# Patient Record
Sex: FEMALE | Race: WHITE | Employment: FULL TIME | URBAN - METROPOLITAN AREA
[De-identification: names, ages, dates, MRNs, and addresses within clinical notes are randomized per-mention and may not be internally consistent; named-entity substitution may affect disease eponyms.]

---

## 2017-06-26 ENCOUNTER — HOSPITAL ENCOUNTER (OUTPATIENT)
Dept: PHYSICAL THERAPY | Age: 32
Discharge: HOME | End: 2017-06-26
Payer: COMMERCIAL

## 2017-06-26 PROCEDURE — 97162 PT EVAL MOD COMPLEX 30 MIN: CPT

## 2017-06-26 PROCEDURE — 97110 THERAPEUTIC EXERCISES: CPT

## 2017-06-26 NOTE — PROGRESS NOTES
PHYSICAL THERAPY - DAILY TREATMENT NOTE    Patient Name: Javier Love        Date: 2017  : 1985   YES Patient  Verified  Visit #:     Insurance: Payor: Portillo Fontanez / [place]: [place] / Product Type: PPO /      In time: 2:46 Out time: 3:41   Total Treatment Time: 55     Medicare Time Tracking (below)   Total Timed Codes (min):  na 1:1 Treatment Time:  na     TREATMENT AREA =  Right shoulder    SUBJECTIVE    Pain Level (on 0 to 10 scale):  4  / 10   Medication Changes/New allergies or changes in medical history, any new surgeries or procedures? NO    If yes, update Summary List   Subjective Functional Status/Changes:  []  No changes reported     See POC          OBJECTIVE    10 min Therapeutic Exercise:  [x]  See flow sheet   Rationale:      increase ROM, increase strength and reduce pain to improve the patients ability to lift/exercise/reach     Other Objective/Functional Measures:    Shown and performed HEP     Post Treatment Pain Level (on 0 to 10) scale:   4 / 10     ASSESSMENT  Assessment/Changes in Function:     See POC     []  See Progress Note/Recertification   Patient will continue to benefit from skilled PT services to modify and progress therapeutic interventions, address functional mobility deficits, address strength deficits, analyze and address soft tissue restrictions, analyze and cue movement patterns and analyze and modify body mechanics/ergonomics to attain goals per POC.    Progress toward goals / Updated goals:    See POC     PLAN  [x]  Upgrade activities as tolerated YES Continue plan of care   []  Discharge due to :    []  Other: Will send CRPS contrast bath for UE procedure for HEP as well     Therapist: Jihan Quevedo PT, DPT, OCS, CSCS    Date: 2017 Time: 3:43 PM

## 2017-06-26 NOTE — PROGRESS NOTES
[address] PHYSICAL THERAPY  [address] [place] [address] 51, [address],[facility], 70 [facility] - Phone: [phone]  Fax: [phone] / [facility]  Patient Name: Cassie Johnson : 1985   Medical   Diagnosis: Right shoulder, brachial plexus injury Treatment Diagnosis: Right shoulder pain   Onset Date: , then approx 2 weeks ago     Referral Source: Maxwell Shetty MD Start of Care [facility]): 2017   Prior Hospitalization: See medical history Provider #: [id number]   Prior Level of Function: Prior to recent onset no sig diff with lifting if stabilized shoudler in advance, less radicular symptoms down UE   Comorbidities: none   Medications: Verified on Patient Summary List   The Plan of Care and following information is based on the information from the initial evaluation.   ===========================================================================================  Assessment / rice information:  Cassie Johnson is a 33 y.o.  yo female with Dx: right shoulder/arm pain, brachial plexus injury, who reports initial injury in  playing softball and colliding with large player, had UE numbness up to neck/eye, down to radial hand/arm with some cold sensations as well as skin changes at times with some swelling, symptoms improved and was able to return to some activity, then attempted softball again and had reinjury with throwing, went to PT, improved and was able to do general lifts if setting shoulder prior to lifting; still pain and numbness/tingling with most axillary traction exercises and any impact/pressure to upper trap on right or deltoid on right. Pt rates pain as 9/10 max, 2/10 at best, 4/10 today. MD has ordered and Xray, has previously been on meds including steriods, anti-inflammatories; had EMG after original injury but none recently; lidocaine injection recently to upper trap with some relief. Objective: FOTO score = 47 (an established functional score where 100 = no disability). UE AROM WNL and grossly bilat sym although noted hesitation with Right UE AROM into forward elevation and some distress at zone of impingment. Pt does have audible pop with bilat shlds fwd elevation. UE strength manual muscle testing normal and symmetrical for all planes. Palpation shows tenderness without any triggering at right upper trap, post cuff, post CS and subacromial space. ULTT 1 median nerve position on right, minimally pos on left. Adson/Connor's TOS tests negative. CS compression neg. Full CS AROM without pain (some reported stretch with left CS lat flexion on right UT). Pt instructed in HEP and will f/u in clinic for PT.  ===========================================================================================  Eval Complexity: History MEDIUM  Complexity : 1-2 comorbidities / personal factors will impact the outcome/ POC ;  Examination  HIGH Complexity : 4+ Standardized tests and measures addressing body structure, function, activity limitation and / or participation in recreation ; Presentation MEDIUM Complexity : Evolving with changing characteristics ;   Decision Making MEDIUM Complexity : FOTO score of 26-74; Overall Complexity MEDIUM  Problem List: pain affecting function, decrease ROM, decrease strength, decrease ADL/ functional abilitiies and decrease activity tolerance FOTO = 47  Treatment Plan may include any combination of the following: Therapeutic exercise, Therapeutic activities, Neuromuscular re-education, Physical agent/modality, Manual therapy, Patient education and Self Care training  Patient / Family readiness to learn indicated by: asking questions, trying to perform skills and interest  Persons(s) to be included in education: patient (P)  Barriers to Learning/Limitations: no  Measures taken:    Patient Goal (s): \"not be affected by weather, participate in activity\"   Patient self reported health status: good  Rehabilitation Potential: good   Short Term Goals: To be accomplished in  1-2  weeks:  1. Independent with HEP. 2. Decrease max pain 25-50% to assist with lifting/reaching overhead for ADLs.  Long Term Goals: To be accomplished in  4-6  weeks:  1. Decrease max pain 50-75% to assist with gym activity and work activity without hindering performance. 2.  Improve FOTO Functional Status Score by 21 points in order to show significant functional improvement. 3.  Will rate a +5 on Global Rating of Change and be prepared to DC to HEP. Frequency / Duration:   Patient to be seen  2-3  times per week for 4-6  weeks:  Patient / Caregiver education and instruction: self care and exercises  G-Codes (GP): HARPER  Therapist Signature: Colt Odom PT, DPT, OCS, CSCS Date: 5/60/9981   Certification Period: NA Time: 3:43 PM   ===========================================================================================  I certify that the above Physical Therapy Services are being furnished while the patient is under my care. I agree with the treatment plan and certify that this therapy is necessary. Physician Signature:        Date:       Time:     Please sign and return to [facility] or you may fax the signed copy to [phone]. Thank you.

## 2017-06-30 ENCOUNTER — HOSPITAL ENCOUNTER (OUTPATIENT)
Dept: PHYSICAL THERAPY | Age: 32
Discharge: HOME | End: 2017-06-30
Payer: COMMERCIAL

## 2017-06-30 PROCEDURE — 97110 THERAPEUTIC EXERCISES: CPT

## 2017-06-30 PROCEDURE — 97140 MANUAL THERAPY 1/> REGIONS: CPT

## 2017-06-30 NOTE — PROGRESS NOTES
PHYSICAL THERAPY - DAILY TREATMENT NOTE    Patient Name: Rosemarie Bond        Date: 2017  : 1985   YES Patient  Verified  Visit #:   2   of   12  Insurance: Payor: Cristian Chavira / Plan: [place] [employer] / Product Type: PPO /      In time: 9:35 Out time: 10:20   Total Treatment Time: 45     Medicare Time Tracking (below)   Total Timed Codes (min):  na 1:1 Treatment Time:  na     TREATMENT AREA =  Right shoulder pain [M25.511]    SUBJECTIVE  Pain Level (on 0 to 10 scale):  0  / 10   Medication Changes/New allergies or changes in medical history, any new surgeries or procedures? NO    If yes, update Summary List   Subjective Functional Status/Changes:  []  No changes reported     No new c/o          OBJECTIVE      20 min Therapeutic Exercise:  [x]  See flow sheet   Rationale:      increase ROM, increase strength and improve coordination to improve the patients ability to perform ADLs     25 min Manual Therapy: DTM teres, post delt, Pec, SCM, Scalene, C2 mob with R Rot, R 1st rib mob, T/S mob   Rationale:      decrease pain, increase ROM and increase tissue extensibility to improve patient's ability to perform ADLs       min Patient Education:  YES  Reviewed HEP   []  Progressed/Changed HEP based on: Other Objective/Functional Measures:    Difficulty with UE roll noted (sup to pro more difficulty)     Post Treatment Pain Level (on 0 to 10) scale:   0  / 10     ASSESSMENT  Assessment/Changes in Function:     Good buck to all Rx without increase in pain      []  See Progress Note/Recertification   Patient will continue to benefit from skilled PT services to modify and progress therapeutic interventions, address functional mobility deficits, address ROM deficits, address strength deficits, analyze and address soft tissue restrictions, analyze and cue movement patterns, analyze and modify body mechanics/ergonomics and assess and modify postural abnormalities to attain remaining goals.    Progress toward goals / Updated goals:     Independent with HEP     PLAN  []  Upgrade activities as tolerated YES Continue plan of care   []  Discharge due to :    []  Other:      Therapist: Todd Valencia, PT, OCS, SCS, CSCS    Date: 6/30/2017 Time: 10:42 AM       Future Appointments  Date Time Provider Dolly Solorio   7/3/2017 4:30 PM Kristal Rivera, PT [facility]   7/7/2017 9:00 AM Patti Meeks, PT [facility]   7/18/2017 5:00 PM Patti Meeks, PT [facility]   7/20/2017 2:00 PM Tray Gonzalez, PT [facility]   7/25/2017 9:00 AM Tray Gonzalez, PT [facility]   7/27/2017 8:00 AM Patti Meeks, PT [facility]

## 2017-06-30 NOTE — PROGRESS NOTES
[address] PHYSICAL THERAPY  [address] [place] [address] 51, [address],[place], 70 [facility] - Phone: [phone]  Fax: [phone]  Request for use of Dry Needling/Intramuscular Manual Therapy  Patient Name: Zhao Mcneil : 1985   Treatment/Medical Diagnosis: Right shoulder pain [M25.511]   Referral Source: Geovanny Callaway MD     Date of Initial Visit: 17 Attended Visits: 2 Missed Visits: 0     Based on findings from the physical therapy examination and evaluation, the evaluating therapist believes the patient, Zhao Mcneil would benefit from including Dry Needling as part of the plan of care. Dry needling is an effective treatment technique utilized in conjunction with other physical therapy interventions to inactivate myofascial trigger points and the pain and dysfunction they cause. It involves the use of a very fine (usually 0.3 mm/30 gauge) solid filament sterile needle (also used for acupuncture) which is inserted into the skin and directly into a myofascial trigger point. Repeated strokes or movements of the needle without completely withdrawing it help to inactive the trigger point, all of which may take approximately 30 - 60 seconds at each site. Benefits include inactivation of trigger points, decreased pain, increased muscle length, improved movement patterns, and restoration of function. Potential risks include the following: post-needling soreness, infection, bruising/bleeding, penetration of a nerve, and pneumothorax. All treating therapist have been thoroughly educated in ways to avoid the adverse reactions. Dry Needling is an advanced procedure that requires additional training including greater than 54 hours of intensive course work.  Most treatment programs will consist of one session of dry needling each week and a possible second treatment to consist of muscle re-education, flexibility, strengthening and other manual techniques to facilitate the benefits from the dry needling therapy. If you agree with this recommendation, please sign the attached prescription form and fax it to us at [phone]. If you have questions or concerns regarding dry needling or any other treatment we may be providing, please contact us at [phone]. Thank you for allowing us to assist in the care of your patient. Therapist Signature: Victorino Daly, PT, OCS, SCS, CSCS Date: 6/30/2017     Time: 4:21 PM   NOTE TO PHYSICIAN:  PLEASE COMPLETE THE ORDERS BELOW AND FAX TO   [facility] Physical [facility]: [phone]  If you are unable to process this request in 24 hours please contact our office: [phone] I have read the above request and AGREE to the recommendation of including dry needling as part of the plan of care. ? I have read the above request and DO NOT AGREE to including dry needling as part of the plan of care.    ? I have read the above report and request that my patient continue therapy with the following changes/special instructions: _________________________________________________     Physician Signature:        Date:       Time:

## 2017-07-03 ENCOUNTER — HOSPITAL ENCOUNTER (OUTPATIENT)
Dept: PHYSICAL THERAPY | Age: 32
Discharge: HOME | End: 2017-07-03
Payer: COMMERCIAL

## 2017-07-03 PROCEDURE — 97110 THERAPEUTIC EXERCISES: CPT

## 2017-07-03 PROCEDURE — 97140 MANUAL THERAPY 1/> REGIONS: CPT

## 2017-07-07 ENCOUNTER — HOSPITAL ENCOUNTER (OUTPATIENT)
Dept: PHYSICAL THERAPY | Age: 32
Discharge: HOME | End: 2017-07-07
Payer: COMMERCIAL

## 2017-07-07 PROCEDURE — 97110 THERAPEUTIC EXERCISES: CPT

## 2017-07-07 PROCEDURE — 97140 MANUAL THERAPY 1/> REGIONS: CPT

## 2017-07-07 NOTE — PROGRESS NOTES
PHYSICAL THERAPY - DAILY TREATMENT NOTE    Patient Name: Roxanne Olivas        Date: 2017  : 1985   YES Patient  Verified  Visit #:      18  Insurance: Payor: Jose F Chris / Plan: [place] [employer] PPO / Product Type: PPO /      In time: 9:05 Out time: 10:00   Total Treatment Time: 55     Medicare Time Tracking (below)   Total Timed Codes (min):  na 1:1 Treatment Time:  na     TREATMENT AREA =  Right shoulder pain [M25.511]    SUBJECTIVE  Pain Level (on 0 to 10 scale):  2-3  / 10   Medication Changes/New allergies or changes in medical history, any new surgeries or procedures? NO    If yes, update Summary List   Subjective Functional Status/Changes:  []  No changes reported     Feels very tight today          OBJECTIVE    30 min Manual Therapy: DTM teres, post delt, Pec, biceps, wrist flerxors, C2 mob with R Rot, T/S mob   Rationale:      decrease pain, increase ROM and increase tissue extensibility to improve patient's ability to lift/reach     25 min Therapeutic Exercise:  [x]  See flow sheet   Rationale:      increase ROM and increase strength to improve the patients ability to lift/reach      min Patient Education:  YES  Reviewed HEP   []  Progressed/Changed HEP based on: Other Objective/Functional Measures:    Able to perform R UE roll with good form      Post Treatment Pain Level (on 0 to 10) scale:   0  / 10     ASSESSMENT  Assessment/Changes in Function:     Good buck to all Rx withoutincrease in pain      []  See Progress Note/Recertification   Patient will continue to benefit from skilled PT services to modify and progress therapeutic interventions, address functional mobility deficits, address ROM deficits, address strength deficits, analyze and address soft tissue restrictions, analyze and cue movement patterns, analyze and modify body mechanics/ergonomics and assess and modify postural abnormalities to attain remaining goals.    Progress toward goals / Updated goals:    Slowly progressing with pain reduction     PLAN  []  Upgrade activities as tolerated YES Continue plan of care   []  Discharge due to :    []  Other:      Therapist: Dave Butterfield, PT, OCS, SCS, CSCS    Date: 7/7/2017 Time: 8:03 AM       Future Appointments  Date Time Provider Dolly Solorio   7/7/2017 9:00 AM Sundeep Valentine PT [facility]   7/18/2017 5:00 PM Sundeep Valentine PT [facility]   7/20/2017 2:00 PM Scout [facility], PT [facility]   7/25/2017 9:00 AM [address] [place] [address], PT [facility]   7/27/2017 8:00 AM Sundeep Valentine PT [facility]

## 2017-07-18 ENCOUNTER — HOSPITAL ENCOUNTER (OUTPATIENT)
Dept: PHYSICAL THERAPY | Age: 32
Discharge: HOME | End: 2017-07-18
Payer: COMMERCIAL

## 2017-07-18 PROCEDURE — 97110 THERAPEUTIC EXERCISES: CPT

## 2017-07-18 PROCEDURE — 97140 MANUAL THERAPY 1/> REGIONS: CPT

## 2017-07-18 NOTE — PROGRESS NOTES
PHYSICAL THERAPY - DAILY TREATMENT NOTE    Patient Name: Roxanne Olivas        Date: 2017  : 1985   YES Patient  Verified  Visit #:     Insurance: Payor: Kohlerervin Chris / Plan: [place] [employer] PPO / Product Type: PPO /      In time: 4:45 Out time: 5:30   Total Treatment Time: 45     Medicare Time Tracking (below)   Total Timed Codes (min):  na 1:1 Treatment Time:  na     TREATMENT AREA =  Right shoulder pain [M25.511]    SUBJECTIVE  Pain Level (on 0 to 10 scale):  1-2  / 10   Medication Changes/New allergies or changes in medical history, any new surgeries or procedures? NO    If yes, update Summary List   Subjective Functional Status/Changes:  []  No changes reported     Its just my normal pain           OBJECTIVE    10 min Manual Therapy: T/S mob, DTM R wrist flx   Rationale:      decrease pain, increase ROM and increase tissue extensibility to improve patient's ability to lift/reach      25 min Therapeutic Exercise:  [x]  See flow sheet   Rationale:      increase ROM and increase strength to improve the patients ability to lift/reach   Dry Needling Procedure Note    Dry Needle Session Number:  1    Procedure: An intramuscular manual therapy (dry needling) and a neuro-muscular re-education treatment was done to deactivate myofascial trigger points, with a 15/30 gauge solid filament needle, under aseptic technique. Indication(s): [] Muscle spasms [] Myalgia/Myositis  [] Muscle cramps      [] Muscle imbalances [] TMD (TMJ) [] Myofascial pain & dysfunction     [] Other: __    Chart reviewed for the following:  IJoselyn PT, have reviewed the medical history, summary list and precautions/contraindications for Roxanne Olivas.     TIME OUT performed immediately prior to start of procedure:  4:55 (enter time the timeout was completed)  Joselyn NARVAEZ PT, have performed the following reviews on Roxanne Olivas prior to the start of the session:      [x] Patient was identified by name and date of birth    [x] Agreement on all muscles being treated was verified   [x] Purpose of dry needling, side effects, possible complications, risks and benefits were explained to the patient   [x] Procedure site(s) verified  [x] Patient was positioned for comfort and draped for privacy  [x] Informed Consent was signed (initial visit) and verified verbally (subsequent visits)  [x] Patient was instructed on the need to report the use of blood thinners and/or immunosuppressant medications  [x] How to respond to possible adverse effects of treatment  [x] Self treatment of post needling soreness: ice, heat (moist heat, heat wraps) and stretching  [x] Opportunity was given to ask any questions, all questions were answered            Treatment:  The following muscles were treated today:    Right: UT, pec, biceps, SCM   Left:      Patients response to todays treatment:   [x]  LTRs  [x]  Muscle Relaxation  []  Pain Relief  []  Decreased Tinnitus  []  Decreased HAs []  Post needling soreness []  Increased ROM   []  Other:       min Patient Education:  YES  Reviewed HEP   []  Progressed/Changed HEP based on: Other Objective/Functional Measures:  Able to perform all UE rolling pattern without any difficulty  Full c/s AROM in all planes after man Rx     Post Treatment Pain Level (on 0 to 10) scale:   1  / 10     ASSESSMENT  Assessment/Changes in Function:     Good buck to all Rx without increase in pain      []  See Progress Note/Recertification   Patient will continue to benefit from skilled PT services to modify and progress therapeutic interventions, address functional mobility deficits, address ROM deficits, address strength deficits, analyze and address soft tissue restrictions, analyze and cue movement patterns, analyze and modify body mechanics/ergonomics and assess and modify postural abnormalities to attain remaining goals.    Progress toward goals / Updated goals:    Slowly progressing with symptom reduction      PLAN  []  Upgrade activities as tolerated YES Continue plan of care   []  Discharge due to :    []  Other:      Therapist: Silvestre Craig, PT, OCS, SCS, CSCS    Date: 7/18/2017 Time: 10:12 AM       Future Appointments  Date Time Provider Dolly Solorio   7/18/2017 5:00 PM Alexandrai Diaz PT [facility]   7/20/2017 2:00 PM [address] [place] [address], PT [facility]   7/25/2017 9:00 AM [address] [place] [address], PT [facility]   7/27/2017 8:00 AM Alexandria Diaz PT [facility]

## 2017-07-20 ENCOUNTER — HOSPITAL ENCOUNTER (OUTPATIENT)
Dept: PHYSICAL THERAPY | Age: 32
Discharge: HOME | End: 2017-07-20
Payer: COMMERCIAL

## 2017-07-20 PROCEDURE — 97110 THERAPEUTIC EXERCISES: CPT

## 2017-07-20 PROCEDURE — 97140 MANUAL THERAPY 1/> REGIONS: CPT

## 2017-07-20 NOTE — PROGRESS NOTES
PHYSICAL THERAPY - DAILY TREATMENT NOTE    Patient Name: Ziggy Gonzáles        Date: 2017  : 1985   yes Patient  Verified  Visit #:     Insurance: Payor: Genna Posada / Plan: [place] [employer] PPO / Product Type: PPO /      In time: 2:00 Out time: 2:41   Total Treatment Time: 41     Medicare Time Tracking (below)   Total Timed Codes (min):  na 1:1 Treatment Time:  na     TREATMENT AREA =  Right shoulder pain [M25.511]    SUBJECTIVE  Pain Level (on 0 to 10 scale):  1  / 10   Medication Changes/New allergies or changes in medical history, any new surgeries or procedures?    no  If yes, update Summary List   Subjective Functional Status/Changes:  []  No changes reported     Pt reports feeling better after initial DN tx, with some tension/soreness to the R UT reported. Pt denies UE tingling and feels overall she is improved         OBJECTIVE    21 min Therapeutic Exercise:  [x]  See flow sheet   Rationale:      increase ROM and increase strength to improve the patients ability to complete overhead tasks     20 min Manual Therapy: STM/TPR to R UT, scalenes pec minor, pec major, teres minor, infraspinatus, bicep   Rationale:      decrease pain, increase ROM, increase tissue extensibility and decrease trigger points to improve patient's ability to reach     min Patient Education:  yes  Reviewed HEP   []  Progressed/Changed HEP based on: Other Objective/Functional Measures:    Large TrP w/ inc TTP noted to the R teres minor  Progressed to 1/4 TGU and prone Y this visit; req tactile cueing to engage LT vs UT prone Y  Cont w/ audible click at end-range shoulder flexion R     Post Treatment Pain Level (on 0 to 10) scale:   0  / 10     ASSESSMENT  Assessment/Changes in Function:     Plan to DN teres minor at NV.  Pt progressing steadily towards all goals     []  See Progress Note/Recertification   Patient will continue to benefit from skilled PT services to modify and progress therapeutic interventions, address functional mobility deficits, address ROM deficits, address strength deficits, analyze and address soft tissue restrictions, analyze and cue movement patterns, analyze and modify body mechanics/ergonomics, assess and modify postural abnormalities and instruct in home and community integration to attain remaining goals.    Progress toward goals / Updated goals:    Excellent progress to LTG #1     PLAN  []  Upgrade activities as tolerated yes Continue plan of care   []  Discharge due to :    []  Other:      Therapist: Scout Shepard PT    Date: 7/20/2017 Time: 2:03 PM     Future Appointments  Date Time Provider Dolly Solorio   7/25/2017 9:00 AM EAGLE Dumas [facility]   7/27/2017 8:00 AM Connie Herman PT [facility]

## 2017-07-25 ENCOUNTER — HOSPITAL ENCOUNTER (OUTPATIENT)
Dept: PHYSICAL THERAPY | Age: 32
Discharge: HOME | End: 2017-07-25
Payer: COMMERCIAL

## 2017-07-25 PROCEDURE — 97110 THERAPEUTIC EXERCISES: CPT

## 2017-07-25 PROCEDURE — 97140 MANUAL THERAPY 1/> REGIONS: CPT

## 2017-07-25 NOTE — PROGRESS NOTES
PHYSICAL THERAPY - DAILY TREATMENT NOTE    Patient Name: Maty Patino        Date: 2017  : 1985   yes Patient  Verified  Visit #:     Insurance: Payor: Manuel Jhony / Plan: K[employer]i President PPO / Product Type: PPO /      In time: 9:05 Out time: 9:40   Total Treatment Time: 35     Medicare Time Tracking (below)   Total Timed Codes (min):  na 1:1 Treatment Time:  na     TREATMENT AREA =  Right shoulder pain [M25.511]    SUBJECTIVE  Pain Level (on 0 to 10 scale):  1  / 10   Medication Changes/New allergies or changes in medical history, any new surgeries or procedures?    no  If yes, update Summary List   Subjective Functional Status/Changes:  []  No changes reported     Pt reports tension R anterior chest and hand. Denies paresthesias. OBJECTIVE    15 min Therapeutic Exercise:  [x]  See flow sheet   Rationale:      increase ROM and increase strength to improve the patients ability to complete overhead tasks     20 min Manual Therapy: DN per procedural note, t/s PA manip, R median n glide   Rationale:      decrease pain, increase ROM, increase tissue extensibility and decrease trigger points to improve patient's ability to complete ADLs     min Patient Education:  yes  Reviewed HEP   []  Progressed/Changed HEP based on: Other Objective/Functional Measures:    Dry Needling Procedure Note    Dry Needle Session Number:  2    Procedure: An intramuscular manual therapy (dry needling) and a neuro-muscular re-education treatment was done to deactivate myofascial trigger points, with a 15/30 gauge solid filament needle, under aseptic technique. Indication(s): [] Muscle spasms [] Myalgia/Myositis  [] Muscle cramps      [] Muscle imbalances [] TMD (TMJ) [x] Myofascial pain & dysfunction     [] Other: __    Chart reviewed for the following:  I, [address], PT, have reviewed the medical history, summary list and precautions/contraindications for Maty Patino.     TIME OUT performed immediately prior to start of procedure:  9:05 (enter time the timeout was completed)  Mary NARVAEZ, PT, have performed the following reviews on Zia Millan prior to the start of the session:      [x] Patient was identified by name and date of birth    [x] Agreement on all muscles being treated was verified   [x] Purpose of dry needling, side effects, possible complications, risks and benefits were explained to the patient   [x] Procedure site(s) verified  [x] Patient was positioned for comfort and draped for privacy  [x] Informed Consent was signed (initial visit) and verified verbally (subsequent visits)  [x] Patient was instructed on the need to report the use of blood thinners and/or immunosuppressant medications  [x] How to respond to possible adverse effects of treatment  [x] Self treatment of post needling soreness: ice, heat (moist heat, heat wraps) and stretching  [x] Opportunity was given to ask any questions, all questions were answered            Treatment:  The following muscles were treated today:    Right: Ant delt, bicep, pec major, minor, middle scalene, teres minor   Left:      Patients response to todays treatment:   [x]  LTRs  []  Muscle Relaxation  []  Pain Relief  []  Decreased Tinnitus  []  Decreased HAs []  Post needling soreness [x]  Increased ROM   []  Other:         Post Treatment Pain Level (on 0 to 10) scale:   0  / 10     ASSESSMENT  Assessment/Changes in Function:     Pt reported referral of sx down the R UE to the hand w/ DN to the R middle scalene  Reported dec hand tension following DN     []  See Progress Note/Recertification   Patient will continue to benefit from skilled PT services to modify and progress therapeutic interventions, address functional mobility deficits, address ROM deficits, address strength deficits, analyze and address soft tissue restrictions, analyze and cue movement patterns, analyze and modify body mechanics/ergonomics, assess and modify postural abnormalities and instruct in home and community integration to attain remaining goals.    Progress toward goals / Updated goals:    Progressing steadily to LTG #1; assess FOTO/GROC at NV     PLAN  []  Upgrade activities as tolerated yes Continue plan of care   []  Discharge due to :    []  Other:      Therapist: Scout Shepard PT    Date: 7/25/2017 Time: 8:57 AM     Future Appointments  Date Time Provider Dolly Solorio   7/25/2017 9:00 AM Scout Shepard, PT [facility]   7/27/2017 8:00 AM Shannen Christie, PT [facility]

## 2017-07-27 ENCOUNTER — HOSPITAL ENCOUNTER (OUTPATIENT)
Dept: PHYSICAL THERAPY | Age: 32
Discharge: HOME | End: 2017-07-27
Payer: COMMERCIAL

## 2017-07-28 ENCOUNTER — HOSPITAL ENCOUNTER (OUTPATIENT)
Dept: PHYSICAL THERAPY | Age: 32
Discharge: HOME | End: 2017-07-28
Payer: COMMERCIAL

## 2017-07-28 PROCEDURE — 97110 THERAPEUTIC EXERCISES: CPT

## 2017-07-28 PROCEDURE — 97140 MANUAL THERAPY 1/> REGIONS: CPT

## 2017-07-28 NOTE — PROGRESS NOTES
PHYSICAL THERAPY - DAILY TREATMENT NOTE    Patient Name: Lennox Rehman        Date: 2017  : 1985   YES Patient  Verified  Visit #:     Insurance: Payor: Pat Zhao / Plan: [place] [employer] PPO / Product Type: PPO /      In time: 9:20 Out time: 10:20   Total Treatment Time: 60     Medicare Time Tracking (below)   Total Timed Codes (min):  na 1:1 Treatment Time:  na     TREATMENT AREA =  Right shoulder pain [M25.511]    SUBJECTIVE  Pain Level (on 0 to 10 scale):  1  / 10   Medication Changes/New allergies or changes in medical history, any new surgeries or procedures? NO    If yes, update Summary List   Subjective Functional Status/Changes:  []  No changes reported     I woke up with tingling my hand a little           OBJECTIVE    30 min Manual Therapy: DTM teres, scalene post delt, Pec, biceps, wrist flerxors, R 1st rib mob   Rationale:      decrease pain, increase ROM and increase tissue extensibility to improve patient's ability to lift/reach      30 min Therapeutic Exercise:  [x]  See flow sheet   Rationale:      increase ROM and increase strength to improve the patients ability to lift/reach         min Patient Education:  YES  Reviewed HEP   []  Progressed/Changed HEP based on: Other Objective/Functional Measures:    FOTO = 62  GROC = +5     Post Treatment Pain Level (on 0 to 10) scale:   0  / 10     ASSESSMENT  Assessment/Changes in Function:     Good buck to all Rx without increase in pain      []  See Progress Note/Recertification   Patient will continue to benefit from skilled PT services to modify and progress therapeutic interventions, address functional mobility deficits, address ROM deficits, address strength deficits, analyze and address soft tissue restrictions, analyze and cue movement patterns, analyze and modify body mechanics/ergonomics and assess and modify postural abnormalities to attain remaining goals.    Progress toward goals / Updated goals:    Progressing well towards LTGs     PLAN  []  Upgrade activities as tolerated YES Continue plan of care   []  Discharge due to :    []  Other:      Therapist: Hannah Hope, PT, OCS, SCS, CSCS    Date: 7/28/2017 Time: 6:34 AM       Future Appointments  Date Time Provider Dolly Solorio   7/28/2017 9:30 AM [facility]

## 2017-07-28 NOTE — PROGRESS NOTES
[facility] PHYSICAL THERAPY  [address] [place] [address],[facility], [facility] - Phone: [phone]  Fax: [phone]  PROGRESS NOTE  Patient Name: Lindsay Smith : 1985   Treatment/Medical Diagnosis: Right shoulder pain [M25.511]   Referral Source: Michele Lara MD     Date of Initial Visit: 17 Attended Visits: 8 Missed Visits: 0     SUMMARY OF TREATMENT  Lindsay Smith has been seen at our clinic 2-3x/wk for a total of 8 visits. Pt treatment has consisted of , therapeutic exercise for cervical/thoracic ROM, postural correction, and manual therapy (deep tissue mobilizations/dry needling and cervical/thoracic mobilzations)  CURRENT STATUS  Pt has had a good tolerance to physical therapy treatment. She reports significantly decreased R UE pain and demonstrate improved cervical/thoracic mobility. However, she continues to presents with increased soft tissue tension at pec minor, upper trapezius, and elbow/wrist flexors and  complain of numbness down to her R index finger. Goal/Measure of Progress Goal Met? 1. Decrease Max pain by 50-75% to assist with ADLs   Status at last Eval: 9/10 Current Status: 2-3/10 yes   2. Increase FOTO score by 21 % show functional improvement   Status at last Eval: 47% Current Status: 62% progressing   3. Will rate >/= +5 on Global Rating of Change and be prepared to DC to HEP. Status at last Eval: na Current Status: +5 yes     New Goals to be achieved in __4__  weeks:  1. Continue with goal #2 above   2.     3.     RECOMMENDATIONS    Specifics: 2-3x/wk for 4 more wks  If you have any questions/comments please contact us directly at [phone]. Thank you for allowing us to assist in the care of your patient.     Therapist Signature: Estephania Finley, DPT, OCS, SCS, CSCS Date: 2017     Time: 11:04 AM   NOTE TO PHYSICIAN:  PLEASE COMPLETE THE ORDERS BELOW AND FAX TO   [facility]: [phone]  If you are unable to process this request in 24 hours please contact our office: [phone]    ___ I have read the above report and request that my patient continue as recommended.   ___ I have read the above report and request that my patient continue therapy with the following changes/special instructions:_________________________________________________________   ___ I have read the above report and request that my patient be discharged from therapy.      Physician Signature:        Date:       Time:

## 2017-08-01 ENCOUNTER — HOSPITAL ENCOUNTER (OUTPATIENT)
Dept: PHYSICAL THERAPY | Age: 32
Discharge: HOME | End: 2017-08-01
Payer: COMMERCIAL

## 2017-08-01 PROCEDURE — 97110 THERAPEUTIC EXERCISES: CPT

## 2017-08-01 PROCEDURE — 97140 MANUAL THERAPY 1/> REGIONS: CPT

## 2017-08-01 NOTE — PROGRESS NOTES
PHYSICAL THERAPY - DAILY TREATMENT NOTE    Patient Name: Dallas Doll        Date: 2017  : 1985   YES Patient  Verified  Visit #:      18  Insurance: Payor: Nato Brock / Plan: Alex Carrasco PPO / Product Type: PPO /      In time: 11:25 Out time: 12:25   Total Treatment Time: 60     Medicare Time Tracking (below)   Total Timed Codes (min):  na 1:1 Treatment Time:  na     TREATMENT AREA =  Right shoulder pain [M25.511]    SUBJECTIVE  Pain Level (on 0 to 10 scale):  1  / 10   Medication Changes/New allergies or changes in medical history, any new surgeries or procedures? NO    If yes, update Summary List   Subjective Functional Status/Changes:  []  No changes reported     2-3 pain at the worst.  Still gets tingling in my index finger sometimes          OBJECTIVE    15 min Manual Therapy: DTM  wrist flerxors, t/s mob   Rationale:      decrease pain, increase ROM and increase tissue extensibility to improve patient's ability to lift/reach      35 min Therapeutic Exercise:  [x]  See flow sheet   Rationale:      increase ROM and increase strength to improve the patients ability to lift/reach       Dry Needling Procedure Note     Dry Needle Session Number:  3     Procedure:    An intramuscular manual therapy (dry needling) and a neuro-muscular re-education treatment was done to deactivate myofascial trigger points, with a 15/30 gauge solid filament needle, under aseptic technique.     Indication(s):                  [] Muscle spasms                [] Myalgia/Myositis            [] Muscle cramps                                                                                 [] Muscle imbalances                   [] TMD (TMJ)                    [] Myofascial pain & dysfunction                                               [] Other: __     Chart reviewed for the following:  IKami, PT, have reviewed the medical history, summary list and precautions/contraindications for [facility] [address] [address].     TIME OUT performed immediately prior to start of procedure:  11:35 (enter time the timeout was completed)  ILiz, PT, have performed the following reviews on Lennox Rehman prior to the start of the session:      [x] Patient was identified by name and date of birth    [x] Agreement on all muscles being treated was verified   [x] Purpose of dry needling, side effects, possible complications, risks and benefits were explained to the patient   [x] Procedure site(s) verified  [x] Patient was positioned for comfort and draped for privacy  [x] Informed Consent was signed (initial visit) and verified verbally (subsequent visits)  [x] Patient was instructed on the need to report the use of blood thinners and/or immunosuppressant medications  [x] How to respond to possible adverse effects of treatment  [x] Self treatment of post needling soreness: ice, heat (moist heat, heat wraps) and stretching  [x] Opportunity was given to ask any questions, all questions were answered     Treatment:  The following muscles were treated today:     Right: UT, pec, biceps, t/s para/multifidus   Left:        Patients response to todays treatment:   [x]  LTRs                                   [x]  Muscle Relaxation                                          []  Pain Relief                                         []  Decreased Tinnitus  []  Decreased HAs                     []  Post needling soreness            []  Increased ROM                        []  Other:           min Patient Education:  YES  Reviewed HEP   []  Progressed/Changed HEP based on:         Other Objective/Functional Measures:  Cont to have increased soft tissue tension noted at R biceps and UT       Post Treatment Pain Level (on 0 to 10) scale:   0  / 10     ASSESSMENT  Assessment/Changes in Function:     Full cervical ROM noted in all planes     []  See Progress Note/Recertification   Patient will continue to benefit from skilled PT services to modify and progress therapeutic interventions, address functional mobility deficits, address ROM deficits, address strength deficits, analyze and address soft tissue restrictions, analyze and cue movement patterns, analyze and modify body mechanics/ergonomics and assess and modify postural abnormalities to attain remaining goals.    Progress toward goals / Updated goals:    Progressing well with pain reduction      PLAN  []  Upgrade activities as tolerated YES Continue plan of care   []  Discharge due to :    []  Other:      Therapist: Missael Segovia, PT, OCS, SCS, CSCS    Date: 8/1/2017 Time: 9:57 AM       Future Appointments  Date Time Provider Dolly Solorio   8/1/2017 11:30 AM Benito Berry, PT [facility]   8/8/2017 4:00 PM Benito Berry, PT [facility]   8/11/2017 2:00 PM Benito Berry, PT [facility]   8/15/2017 10:30 AM Benito Berry, PT [facility]   8/16/2017 8:30 AM Silvestre Du, PT [facility]   8/21/2017 7:30 AM Benito Berry, PT [facility]   8/23/2017 8:30 AM Benito Berry, PT [facility]   8/28/2017 7:30 AM Benito Berry, PT [facility]   8/30/2017 8:30 AM Benito Berry, PT [facility]

## 2017-08-08 ENCOUNTER — HOSPITAL ENCOUNTER (OUTPATIENT)
Dept: PHYSICAL THERAPY | Age: 32
Discharge: HOME | End: 2017-08-08
Payer: COMMERCIAL

## 2017-08-08 PROCEDURE — 97140 MANUAL THERAPY 1/> REGIONS: CPT

## 2017-08-08 PROCEDURE — 97110 THERAPEUTIC EXERCISES: CPT

## 2017-08-08 NOTE — PROGRESS NOTES
PHYSICAL THERAPY - DAILY TREATMENT NOTE    Patient Name: Loyd Flanagan        Date: 2017  : 1985   YES Patient  Verified  Visit #:   10   of   18  Insurance: Payor: Danis Backer / Plan: [place] [employer] PPO / Product Type: PPO /      In time: 4:00 Out time: 4:35   Total Treatment Time: 35     Medicare Time Tracking (below)   Total Timed Codes (min):  na 1:1 Treatment Time:  na     TREATMENT AREA =  Right shoulder pain [M25.511]    SUBJECTIVE  Pain Level (on 0 to 10 scale):  4  / 10   Medication Changes/New allergies or changes in medical history, any new surgeries or procedures? NO    If yes, update Summary List   Subjective Functional Status/Changes:  []  No changes reported     This rain always makes my whole R arm is swollen and pain runs down to the thumb and index finger, so it is hurting today and I had difficulty sleeping last night. OBJECTIVE  15 min Manual Therapy: DTm, L 1st rib mob   Rationale:      decrease pain, increase ROM and increase tissue extensibility to improve patient's ability to lift/reach      10 min Therapeutic Exercise:  [x]  See flow sheet   Rationale:      increase ROM and increase strength to improve the patients ability to lift/reach       Dry Needling Procedure Note      Dry Needle Session Number:  4      Procedure:    An intramuscular manual therapy (dry needling) and a neuro-muscular re-education treatment was done to deactivate myofascial trigger points, with a 15/30 gauge solid filament needle, under aseptic technique.      Indication(s):                  [] Muscle spasms                [] Myalgia/Myositis            [] Muscle cramps                                                                                 [] Muscle imbalances                   [] TMD (TMJ)                    [] Myofascial pain & dysfunction                                               [] Other: __      Chart reviewed for the following:  Lv NARVAEZ PT, have reviewed the medical history, summary list and precautions/contraindications for [address] [place] [address],[address] performed immediately prior to start of procedure:  4:10 (enter time the timeout was completed)  ILora PT, have performed the following reviews on Kari Hurtado to the start of the session:      [x] Patient was identified by name and date of birth    [x] Agreement on all muscles being treated was verified   [x] Purpose of dry needling, side effects, possible complications, risks and benefits were explained to the patient   [x] Procedure site(s) verified  [x] Patient was positioned for comfort and draped for privacy  [x] Informed Consent was signed (initial visit) and verified verbally (subsequent visits)  [x] Patient was instructed on the need to report the use of blood thinners and/or immunosuppressant medications  [x] How to respond to possible adverse effects of treatment  [x] Self treatment of post needling soreness: ice, heat (moist heat, heat wraps) and stretching  [x] Opportunity was given to ask any questions, all questions were answered      Treatment:  The following muscles were treated today:      Right: UT, pec, biceps, t/s para/multifidus   Left:          Patients response to todays treatment:   [x]  LTRs                                   [x]  Muscle Relaxation                                          []  Pain Relief                                         []  Decreased Tinnitus  []  Decreased HAs                     []  Post needling soreness            []  Increased VGG                        []  Other:           min Patient Education:  YES  Reviewed HEP   []  Progressed/Changed HEP based on:         Other Objective/Functional Measures:    Held most ex secodnary to severe pain today     Post Treatment Pain Level (on 0 to 10) scale:   4  / 10     ASSESSMENT  Assessment/Changes in Function:     Good buck to all Rx without increase in pain      []  See Progress Note/Recertification Patient will continue to benefit from skilled PT services to modify and progress therapeutic interventions, address functional mobility deficits, address ROM deficits, address strength deficits, analyze and address soft tissue restrictions, analyze and cue movement patterns, analyze and modify body mechanics/ergonomics and assess and modify postural abnormalities to attain remaining goals.    Progress toward goals / Updated goals:    No sig change towards LTGs today     PLAN  []  Upgrade activities as tolerated YES Continue plan of care   []  Discharge due to :    []  Other:      Therapist: Ferdinand Hartman, PT, OCS, SCS, CSCS    Date: 8/8/2017 Time: 11:24 AM       Future Appointments  Date Time Provider Dolly Solorio   8/8/2017 4:00 PM Mouna Gipson, PT [facility]   8/11/2017 2:00 PM Mouna Gipson, PT [facility]   8/15/2017 10:30 AM Mouna Gipson PT [facility]   8/16/2017 8:30 AM Aga Du, PT [facility]   8/21/2017 7:30 AM Mouna Gipson, PT [facility]   8/23/2017 8:30 AM Mouna Gipson PT [facility]   8/28/2017 7:30 AM Mouna Gipson, PT [facility]   8/30/2017 8:30 AM Mouna Gipson, PT [facility]

## 2017-08-11 ENCOUNTER — HOSPITAL ENCOUNTER (OUTPATIENT)
Dept: PHYSICAL THERAPY | Age: 32
Discharge: HOME | End: 2017-08-11
Payer: COMMERCIAL

## 2017-08-11 PROCEDURE — 97110 THERAPEUTIC EXERCISES: CPT

## 2017-08-11 NOTE — PROGRESS NOTES
PHYSICAL THERAPY - DAILY TREATMENT NOTE    Patient Name: Geri Holder        Date: 2017  : 1985   YES Patient  Verified  Visit #:     Insurance: Payor: Ivan Mediate / Plan: [place] [employer] PPO / Product Type: PPO /      In time: 2:00 Out time: 2:50   Total Treatment Time: 50     Medicare Time Tracking (below)   Total Timed Codes (min):  na 1:1 Treatment Time:  na     TREATMENT AREA =  Right shoulder pain [M25.511]    SUBJECTIVE  Pain Level (on 0 to 10 scale):  1  / 10   Medication Changes/New allergies or changes in medical history, any new surgeries or procedures? NO    If yes, update Summary List   Subjective Functional Status/Changes:  []  No changes reported     Much better than last visit. OBJECTIVE    15 min Manual Therapy: T/S mob, DTM/ wrist flx, SOR   Rationale:      decrease pain, increase ROM and increase tissue extensibility to improve patient's ability to lift/reach      35 min Therapeutic Exercise:  [x]  See flow sheet   Rationale:      increase ROM and increase strength to improve the patients ability to lift/reach      min Patient Education:  YES  Reviewed HEP   []  Progressed/Changed HEP based on: Other Objective/Functional Measures:    Cont to have increased soft tissue tension noted at wrist flexor     Post Treatment Pain Level (on 0 to 10) scale:   0  / 10     ASSESSMENT  Assessment/Changes in Function:     Good buck to all Rx without increase in pain      []  See Progress Note/Recertification   Patient will continue to benefit from skilled PT services to modify and progress therapeutic interventions, address functional mobility deficits, address ROM deficits, address strength deficits, analyze and address soft tissue restrictions, analyze and cue movement patterns, analyze and modify body mechanics/ergonomics and assess and modify postural abnormalities to attain remaining goals.    Progress toward goals / Updated goals:    Slow progress with pain reduction      PLAN  []  Upgrade activities as tolerated YES Continue plan of care   []  Discharge due to :    []  Other:      Therapist: Eliana Lara, PT, OCS, SCS, CSCS    Date: 8/11/2017 Time: 7:26 AM       Future Appointments  Date Time Provider Dolly Solorio   8/11/2017 2:00 PM Aviva Mandujano, PT [facility]   8/15/2017 10:30 AM Aviva Mandujano PT [facility]   8/16/2017 8:30 AM Juan David Du, PT [facility]   8/21/2017 7:30 AM Aviva Mandujano, PT [facility]   8/23/2017 8:30 AM Aviva Mandujano PT [facility]   8/28/2017 7:30 AM Aviva Mandujano, PT [facility]   8/30/2017 8:30 AM Aviva Mandujano, PT [facility]

## 2017-08-15 ENCOUNTER — HOSPITAL ENCOUNTER (OUTPATIENT)
Dept: PHYSICAL THERAPY | Age: 32
Discharge: HOME | End: 2017-08-15
Payer: COMMERCIAL

## 2017-08-15 PROCEDURE — 97140 MANUAL THERAPY 1/> REGIONS: CPT

## 2017-08-15 PROCEDURE — 97110 THERAPEUTIC EXERCISES: CPT

## 2017-08-15 NOTE — PROGRESS NOTES
PHYSICAL THERAPY - DAILY TREATMENT NOTE    Patient Name: Ramona Linnain        Date: 8/15/2017  : 1985   YES Patient  Verified  Visit #:     Insurance: Payor: Lev Ibanez / Plan: Don [employer] PPO / Product Type: PPO /      In time: 10:25 Out time: 11:05   Total Treatment Time: 40     Medicare Time Tracking (below)   Total Timed Codes (min):  na 1:1 Treatment Time:  na     TREATMENT AREA =  Right shoulder pain [M25.511]    SUBJECTIVE  Pain Level (on 0 to 10 scale):  1  / 10   Medication Changes/New allergies or changes in medical history, any new surgeries or procedures? NO    If yes, update Summary List   Subjective Functional Status/Changes:  []  No changes reported     When weather is not bad, my pain has been very low          OBJECTIVE    30 min Therapeutic Exercise:  [x]  See flow sheet   Rationale:      increase ROM and increase strength to improve the patients ability to lift/reach     8 min Manual Therapy: T/S mob   Rationale:      decrease pain, increase ROM and increase tissue extensibility to improve patient's ability to lift/reach  Dry Needling Procedure Note      Dry Needle Session Number:  5      Procedure:    An intramuscular manual therapy (dry needling) and a neuro-muscular re-education treatment was done to deactivate myofascial trigger points, with a 15/30 gauge solid filament needle, under aseptic technique.      Indication(s):                  [] Muscle spasms                [] Myalgia/Myositis            [] Muscle cramps                                                                                 [] Muscle imbalances                   [] TMD (TMJ)                    [] Myofascial pain & dysfunction                                               [] Other: __      Chart reviewed for the following:  [facility] PT, have reviewed the medical history, summary list and precautions/contraindications for [address] [place] [address] performed immediately prior to start of procedure:  10:35 (enter time the timeout was completed)  Shannan NARVAEZ, PT, have performed the following reviews on [place] to the start of the session:      [x] Patient was identified by name and date of birth    [x] Agreement on all muscles being treated was verified   [x] Purpose of dry needling, side effects, possible complications, risks and benefits were explained to the patient   [x] Procedure site(s) verified  [x] Patient was positioned for comfort and draped for privacy  [x] Informed Consent was signed (initial visit) and verified verbally (subsequent visits)  [x] Patient was instructed on the need to report the use of blood thinners and/or immunosuppressant medications  [x] How to respond to possible adverse effects of treatment  [x] Self treatment of post needling soreness: ice, heat (moist heat, heat wraps) and stretching  [x] Opportunity was given to ask any questions, all questions were answered      Treatment:  The following muscles were treated today:      Right: UT, , biceps, suboccipitals   Left:          Patients response to todays treatment:   [x]  LTRs                                   [x]  Muscle Relaxation                                          []  Pain Relief                                         []  Decreased Tinnitus  []  Decreased HAs                     []  Post needling soreness            []  Increased OMV                        []  Other:           min Patient Education:  YES  Reviewed HEP   []  Progressed/Changed HEP based on:         Other Objective/Functional Measures:    Cont to have in creased soft tissue tension noted at R UT     Post Treatment Pain Level (on 0 to 10) scale:   0  / 10     ASSESSMENT  Assessment/Changes in Function:     Discussed decreasing frequency of visit to 1x/wk and DC at the end of this month     []  See Progress Note/Recertification   Patient will continue to benefit from skilled PT services to modify and progress therapeutic interventions, address functional mobility deficits, address ROM deficits, address strength deficits, analyze and address soft tissue restrictions, analyze and cue movement patterns, analyze and modify body mechanics/ergonomics and assess and modify postural abnormalities to attain remaining goals. Progress toward goals / Updated goals:    Anticipate DC in a few weeks.       PLAN  []  Upgrade activities as tolerated YES Continue plan of care   []  Discharge due to :    []  Other:      Therapist: Robert Flores, PT, OCS, SCS, CSCS    Date: 8/15/2017 Time: 9:58 AM       Future Appointments  Date Time Provider Dolly Solorio   8/15/2017 10:30 AM Crystal Single, PT [facility]   8/16/2017 8:30 AM [address] [place] [address] Avenue, PT [facility]   8/21/2017 7:30 AM Crystal Single, PT [facility]   8/23/2017 8:30 AM Crystal Single, PT [facility]   8/28/2017 7:30 AM Crystal Single, PT [facility]   8/30/2017 8:30 AM Crystal Single, PT [facility]

## 2017-08-16 ENCOUNTER — APPOINTMENT (OUTPATIENT)
Dept: PHYSICAL THERAPY | Age: 32
End: 2017-08-16
Payer: COMMERCIAL

## 2017-08-23 ENCOUNTER — APPOINTMENT (OUTPATIENT)
Dept: PHYSICAL THERAPY | Age: 32
End: 2017-08-23
Payer: COMMERCIAL

## 2017-08-28 ENCOUNTER — HOSPITAL ENCOUNTER (OUTPATIENT)
Dept: PHYSICAL THERAPY | Age: 32
Discharge: HOME | End: 2017-08-28
Payer: COMMERCIAL

## 2017-08-30 ENCOUNTER — APPOINTMENT (OUTPATIENT)
Dept: PHYSICAL THERAPY | Age: 32
End: 2017-08-30
Payer: COMMERCIAL

## 2017-09-19 NOTE — PROGRESS NOTES
[address] PHYSICAL THERAPY  [address] [place] [address] 201,[facility], [facility] - Phone: [phone]  Fax: [phone] SUMMARY  Patient Name: Gina Wiggins : 1985   Treatment/Medical Diagnosis: Right shoulder pain [M25.511]   Referral Source: Rahel Mancini MD       Date of Initial Visit: 17 Attended Visits: 12 Missed Visits: 0      SUMMARY OF TREATMENT  Gina Wiggins has been seen at our clinic 2-3x/wk for a total of 8 visits. Pt treatment has consisted of , therapeutic exercise for cervical/thoracic ROM, postural correction, and manual therapy (deep tissue mobilizations/dry needling and cervical/thoracic mobilzations)  CURRENT STATUS  Pt has had a good tolerance to physical therapy treatment. She reports significantly decreased R UE pain and demonstrate improved cervical/thoracic mobility. She now reports very low level of pain on daily bases except for bad weather days. We believe that her progress has reached its plateau at this point. Therefore, we would like to discharge Ms. Amor Davidson from PT treatment at this time.                Goal/Measure of Progress Goal Met? 1. Decrease Max pain by 50-75% to assist with ADLs   Status at last Eval: 9/10 Current Status: 2-3/10 yes   2. Increase FOTO score by 21 % show functional improvement   Status at last Eval: 47% Current Status: 62% progressing   3. Will rate >/= +5 on Global Rating of Change and be prepared to DC to HEP. Status at last Eval: na Current Status: +5 yes        RECOMMENDATIONS  Discharge from physical therapy treatment with HEP. Specifics:   If you have any questions/comments please contact us directly at [phone]. Thank you for allowing us to assist in the care of your patient.     Therapist Signature: Hannah Hope, DASHA, OCS, SCS, CSCS Date: 2017     Time: 3:39 PM

## 2021-06-02 ENCOUNTER — HOSPITAL ENCOUNTER (OUTPATIENT)
Dept: PHYSICAL THERAPY | Age: 36
Discharge: HOME | End: 2021-06-02
Payer: COMMERCIAL

## 2021-06-02 PROCEDURE — 97110 THERAPEUTIC EXERCISES: CPT

## 2021-06-02 PROCEDURE — 97162 PT EVAL MOD COMPLEX 30 MIN: CPT

## 2021-06-02 NOTE — PROGRESS NOTES
[address] [facility] PHYSICAL THERAPY AT [address] [place] [address]  [address], [address] ,[address], [address]  Phone: [phone]  Fax: [phone] / [address] [address] PHYSICAL THERAPY SERVICES  Patient Name: Cody Chamorro : 1985   Medical   Diagnosis: Neck pain on right side [M54.2]  Right shoulder pain [M25.511] Treatment Diagnosis: R shoulder pain   Onset Date: > 1 year     Referral Source: Rizwan Bean MD [facility]): 2021   Prior Hospitalization: See medical history Provider #: [id number]   Prior Level of Function: Unlimited use of R UE, softball   Comorbidities: Chronic HX of HA/migraines   Medications: Verified on Patient Summary List   The Plan of Care and following information is based on the information from the initial evaluation.   ===========================================================================================  Assessment / key information: Patient is a 29 y.o. female who presents to [facility] with diagnosis of R shoulder pain and brachial plexus injury. The patient reports she collided with another playing softball in , falling onto R side. She reported she was unable to move arm due to nerve damage, swelling along radial side of hand/wrist. EMG tests determined brachial plexus injury and she has responded well to previous PT.  Currently she reports functional use of R shoulder but limited end range ROM, intermittent swelling along radial side of hand when weather changes and throwing motions, dec  strength, migraines 3x/month, tension type HA 1-2x/week    Objective PT examination findings include:  Postural Assessment: FHRS posture R>L shoulder, inc T/S kyphosis  AROM: R shoulder flex 155°, abd 132° (p > 80°)  C/S B Rot 60%, T/S Rot 50%  PROM: hypomobile R GHJ inf/post glides, T/S rot and ext (PA) PIVM, 1st rib depression  MMT: dec deep cervical neck flexor strength (chin tuck/lift 5s), scap retract 3+/5 with UT compensation, R shoulder abd 3+/5 p!, ER 4-/5  Palpation: trigger points throughout B suboccipitals, UT, R SCM, infraspinatus, teres major/minor, pec minor, scalenes  Special Tests: + R ULTT @ 65° elbow flex    A home exercise program was demonstrated and provided to address the above objective and functional deficits. Patient can benefit from skilled PT interventions to improve ROM, decrease pain, to facilitate performance of ADLs & improve overall functional status.   ===========================================================================================  Eval Complexity: History MEDIUM  Complexity : 1-2 comorbidities / personal factors will impact the outcome/ POC ;  Examination  MEDIUM Complexity : 3 Standardized tests and measures addressing body structure, function, activity limitation and / or participation in recreation ; Presentation MEDIUM Complexity : Evolving with changing characteristics ; Decision Making MEDIUM Complexity : FOTO score of 26-74; Overall Complexity MEDIUM  Problem List: pain affecting function, decrease ROM, decrease strength, decrease ADL/ functional abilitiies, decrease activity tolerance and decrease flexibility/ joint mobility, FOTO score 54  Treatment Plan may include any combination of the following: Therapeutic exercise, Therapeutic activities, Neuromuscular re-education, Physical agent/modality, Gait/balance training, Manual therapy including dry needling, Aquatic therapy and Patient education  Patient / Family readiness to learn indicated by: asking questions, trying to perform skills and interest  Persons(s) to be included in education: patient (P)  Barriers to Learning/Limitations: no  Measures taken:    Patient Goal (s): \"manage pain & be able to play catch/recreational activities\"   Patient self reported health status: excellent  Rehabilitation Potential: good   Short Term Goals:  To be accomplished in  1-2  weeks:  1) Patient to be independent and compliant with initial HEP to prevent further disability. 2) Restore R shoulder AROM flex/abd to 160° and pain free so ROM is available for [place] reaching. 3) Patient will report decreased c/o pain to < or = 2/10 to facilitate ease with sleeping with manageable sx in R shoulder and C/S.   Long Term Goals: To be accomplished in  3-4  weeks:  1) Patient to be independent & compliant with a progressive, high level HEP in order to maintain gains made in physical therapy. 2) Improve FOTO score to 63 indicating significant functional improvement in order to return to PLOF. 3) Patient to remain HA/migraine free for 2 weeks to allow for uninterrupted working. 4) Patient able to throw ball (overhand) without SX exacerbation indicating improved shoulder mobility and decreased neural tension needed to return to softball. Frequency / Duration:   Patient to be seen  2-3  times per week for 3-4  weeks:  Patient / Caregiver education and instruction: self care, activity modification and exercises    Therapist Signature: Jewel Quiñones, PT, DPT, MTC, CMTPT Date: 0/2/6644   Certification Period: NA Time: 6:49 PM   ===========================================================================================  I certify that the above Physical Therapy Services are being furnished while the patient is under my care. I agree with the treatment plan and certify that this therapy is necessary. Physician Signature:        Date:       Time:              Jahaira Sanchez MD  Please sign and return to [facility] [place] or you may fax the signed copy to [phone]. Thank you.

## 2021-06-02 NOTE — PROGRESS NOTES
PHYSICAL THERAPY - DAILY TREATMENT NOTE    Patient Name: Jose Eduardo Granados        Date: 2021  : 1985   YES Patient  Verified  Visit #:     Insurance: Payor: Cotristianwillie Boateng / Plan: [employer] HMO/CHOICE PLUS/POS / Product Type: HMO /      In time: 401 Out time: 900   Total Treatment Time: 45     BCBS/Medicare Time Tracking (below)   Total Timed Codes (min):   1:1 Treatment Time:       TREATMENT AREA =  Neck pain on right side [M54.2]  Right shoulder pain [M25.511]    SUBJECTIVE  Pain Level (on 0 to 10 scale):  6  / 10   Medication Changes/New allergies or changes in medical history, any new surgeries or procedures? NO    If yes, update Summary List   Subjective Functional Status/Changes:  []  No changes reported   The patient reports she collided with another playing softball in , falling onto R side. She reported she was unable to move arm due to nerve damage, swelling along radial side of hand/wrist. EMG tests determined brachial plexus injury and she has responded well to previous PT.  Currently she reports functional use of R shoulder but limited end range ROM, intermittent swelling along radial side of hand when weather changes and throwing motions, dec  strength, migraines 3x/month, tension type HA 1-2x/week      Modalities Rationale:     decrease inflammation, decrease pain and increase tissue extensibility to improve patient's ability to perform ADLs   min [] Estim, type/location:                                      []  att     []  unatt     []  w/US     []  w/ice    []  w/heat    min []  Mechanical Traction: type/lbs                   []  pro   []  sup   []  int   []  cont    []  before manual    []  after manual    min []  Ultrasound, settings/location:      min []  Iontophoresis w/ dexamethasone, location:                                               []  take home patch       []  in clinic    min []  Ice     []  Heat    location/position:     min [] Vasopneumatic Device, press/temp:     min []  Other:    [x] Skin assessment post-treatment (if applicable):    [x]  intact    [x]  redness- no adverse reaction     []redness  adverse reaction:        10 min Therapeutic Exercise:  [x]  See flow sheet   Rationale:      increase ROM and increase strength to improve the patients ability to perform unlimited ADLs      min Manual Therapy: Technique:      [] S/DTM []IASTM []PROM  [] Passive Stretching  []manual TPR  []Jt manipulation:Gr I [] II []  III [] IV[] V[]  Treatment/Area:     Rationale:      decrease pain, increase ROM, increase tissue extensibility and decrease trigger points to improve patient's ability to perform ADLs  The manual therapy interventions were performed at a separate and distinct time from the therapeutic activities interventions.      min Dry Needling:   Rationale:      decrease pain, increase ROM, increase tissue extensibility, and decrease trigger points to improve patient's ability to perform ADLs and dec HA  Select one untimed code below based on number of muscle groups needled:  []  CPT 13580:  needle insertion(s) without injection(s); 1 or 2 muscle(s)  []  CPT 01091:  needle insertion(s) without injection(s); 3 or more muscles     min Therapeutic Activity: [x]  See flow sheet   Rationale:    increase ROM, increase strength and improve coordination to improve the patients ability to reach, carry, push and lift with R UE    Billed With/As:   [x] TE   [] TA   [] Neuro   [] Self Care Patient Education: [x] Review HEP    [] Progressed/Changed HEP based on:   [] positioning   [] body mechanics   [] transfers   [] heat/ice application    [x] other: Issued written HEP and reviewed     Other Objective/Functional Measures:    Postural Assessment: FHRS posture R>L shoulder, inc T/S kyphosis  AROM: R shoulder flex 155, abd 132° (p > 80 °)  C/S B Rot 60%, T/S Rot 50%  PROM: hypomobile R GHJ inf/post glides, T/S rot and ext (PA) PIVM, 1st rib depression  MMT: gross  55# L/R  MLT: dec deep cervical neck flexor strength (chin tuck/lift 5s), scap retract 3+/5 with UT compensation, R shoulder abd 3+/5 p!, ER 4-/5  Palpation: trigger points throughout B suboccipitals, UT, R SCM, infraspinatus, teres major/minor, pec minor, scalenes  Special Tests: + R ULTT @ 65° elbow flex     Post Treatment Pain Level (on 0 to 10) scale:   6  / 10     ASSESSMENT  Assessment/Changes in Function:     See POC     []  See Progress Note/Recertification   Patient will continue to benefit from skilled PT services to modify and progress therapeutic interventions, address functional mobility deficits, address ROM deficits, address strength deficits, analyze and address soft tissue restrictions, analyze and cue movement patterns, analyze and modify body mechanics/ergonomics, assess and modify postural abnormalities, address imbalance/dizziness and instruct in home and community integration to attain remaining goals. Progress toward goals / Updated goals:    Progressing towards newly established goals in [address] [facility] [address]  [x]  Upgrade activities as tolerated YES Continue plan of care   []  Discharge due to :    []  Other:      Therapist: Krishna Mendoza, PT, DPT, MTC, CMTPT    Date: 6/2/2021 Time: 8:19 AM     No future appointments.

## 2021-06-09 ENCOUNTER — HOSPITAL ENCOUNTER (OUTPATIENT)
Dept: PHYSICAL THERAPY | Age: 36
Discharge: HOME | End: 2021-06-09
Payer: COMMERCIAL

## 2021-06-09 PROCEDURE — 20561 NDL INSJ W/O NJX 3+ MUSC: CPT

## 2021-06-09 PROCEDURE — 97140 MANUAL THERAPY 1/> REGIONS: CPT

## 2021-06-09 PROCEDURE — 97112 NEUROMUSCULAR REEDUCATION: CPT

## 2021-06-09 NOTE — PROGRESS NOTES
PHYSICAL THERAPY - DAILY TREATMENT NOTE    Patient Name: Cinda Baumgarten        Date: 2021  : 1985   YES Patient  Verified  Visit #:   2   of   12  Insurance: Payor: Colton Kennedy / Plan: Loyd Wong / Product Type: Com[employer]ical /      In time: 1055 Out time: 1593   Total Treatment Time: 65     BCBS/Medicare Time Tracking (below)   Total Timed Codes (min):   1:1 Treatment Time:       TREATMENT AREA =  Neck pain on right side [M54.2]  Right shoulder pain [M25.511]    SUBJECTIVE  Pain Level (on 0 to 10 scale):  6  / 10   Medication Changes/New allergies or changes in medical history, any new surgeries or procedures?     NO    If yes, update Summary List   Subjective Functional Status/Changes:  []  No changes reported   Ruth always been a shallow breather        Modalities Rationale:     decrease inflammation, decrease pain and increase tissue extensibility to improve patient's ability to perform ADLs   min [] Estim, type/location:                                      []  att     []  unatt     []  w/US     []  w/ice    []  w/heat    min []  Mechanical Traction: type/lbs                   []  pro   []  sup   []  int   []  cont    []  before manual    []  after manual    min []  Ultrasound, settings/location:      min []  Iontophoresis w/ dexamethasone, location:                                               []  take home patch       []  in clinic   10 min []  Ice     [x]  Heat    location/position: R C/S in supine    min []  Vasopneumatic Device, press/temp:     min []  Other:    [x] Skin assessment post-treatment (if applicable):    [x]  intact    [x]  redness- no adverse reaction     []redness  adverse reaction:         min Therapeutic Exercise:  [x]  See flow sheet   Rationale:      increase ROM and increase strength to improve the patients ability to perform unlimited ADLs     10 min Manual Therapy: Technique:      [] S/DTM []IASTM []PROM  [] Passive Stretching  [x]manual TPR  []Jt manipulation:Gr I [] II []  III [] IV[] V[]  Treatment/Area:  Diaphragm release in supine   Rationale:      decrease pain, increase ROM, increase tissue extensibility and decrease trigger points to improve patient's ability to perform ADLs  The manual therapy interventions were performed at a separate and distinct time from the therapeutic activities interventions. 15 min Dry Needling:   Rationale:      decrease pain, increase ROM, increase tissue extensibility, and decrease trigger points to improve patient's ability to perform ADLs and dec HA  Select one untimed code below based on number of muscle groups needled:  []  CPT 69748:  needle insertion(s) without injection(s); 1 or 2 muscle(s)  [x]  CPT 61502:  needle insertion(s) without injection(s); 3 or more muscles  Dry Needling Procedure Note    Dry Needle Session Number:  1    Procedure: An intramuscular manual therapy (dry needling) and a neuro-muscular re-education treatment was done to deactivate myofascial trigger points, with a 15/30 gauge solid filament needle, under aseptic technique. Indication(s): [] Muscle spasms [] Myalgia/Myositis  [] Muscle cramps      [] Muscle imbalances [] TMD (TMJ) [] Myofascial pain & dysfunction     [] Other: __    Chart reviewed for the following:  Amira NARVAEZ PT, have reviewed the medical history, summary list and precautions/contraindications for [employer]      TIME OUT performed immediately prior to start of procedure:  1115am (enter time the timeout was completed)  Amira NARVAEZ PT, have performed the following reviews on [employer] prior to the start of the session:      [x] Patient was identified by name and date of birth    [x] Agreement on all muscles being treated was verified   [x] Purpose of dry needling, side effects, possible complications, risks and benefits were explained to the patient   [x] Procedure site(s) verified  [x] Patient was positioned for comfort and draped for privacy  [x] Informed Consent was signed (initial visit) and verified verbally (subsequent visits)  [x] Patient was instructed on the need to report the use of blood thinners and/or immunosuppressant medications  [x] How to respond to possible adverse effects of treatment  [x] Self treatment of post needling soreness: ice, heat (moist heat, heat wraps) and stretching  [x] Opportunity was given to ask any questions, all questions were answered            Treatment:  The following muscles were treated today:    Right: UT, infraspinatus, teres major/minor, pecs, SCM and scalenes   Left:      Patients response to todays treatment:   [x]  LTRs  []  Muscle Relaxation  []  Pain Relief  []  Decreased Tinnitus  []  Decreased HAs [x]  Post needling soreness  []  Increased ROM   []  Other:         min Therapeutic Activity: [x]  See flow sheet   Rationale:    increase ROM, increase strength and improve coordination to improve the patients ability to reach, carry, push and lift with R UE    30 min Neuromuscular Re-ed: [x]  See flow sheet   Rationale:    increase ROM, increase strength, improve coordination, improve balance and increase proprioception to improve the patients ability to breathe normally and improve postural awareness    Billed With/As:   [x] TE   [x] TA   [x] Neuro   [] Self Care Patient Education: [x] Review HEP    [] Progressed/Changed HEP based on:   [] positioning   [] body mechanics   [] transfers   [] heat/ice application    [x] other: Issued written HEP and reviewed     Other Objective/Functional Measures:    + LTR elicited to muscles to treated with dry needling technique. No adverse reactions from [address] Treatment Pain Level (on 0 to 10) scale:   5  / 10     ASSESSMENT  Assessment/Changes in Function:     Good release of TP.  Tactile cueing required for diaphragm breathing and prone scap retraction without excessive UT recruitment     []  See Progress Note/Recertification   Patient will continue to benefit from skilled PT services to modify and progress therapeutic interventions, address functional mobility deficits, address ROM deficits, address strength deficits, analyze and address soft tissue restrictions, analyze and cue movement patterns, analyze and modify body mechanics/ergonomics, assess and modify postural abnormalities, address imbalance/dizziness and instruct in home and community integration to attain remaining goals.    Progress toward goals / Updated goals:    Progressing towards STG1     PLAN  [x]  Upgrade activities as tolerated YES Continue plan of care   []  Discharge due to :    []  Other:      Therapist: Santos Dietz, PT, DPT, MTC, CMTPT    Date: 6/9/2021 Time: 8:19 AM     Future Appointments   Date Time Provider Dolly Lyndsey   6/15/2021  7:30 AM Bretta Gallop, PT [facility]   6/17/2021  8:15 AM Bretta Gallop, PT [facility]   6/22/2021  7:30 AM Bretta Gallop, PT [facility]   6/24/2021  7:30 AM Bretta Gallop, PT [facility]   6/29/2021  7:30 AM Bretta Gallop, PT [facility]   7/1/2021  7:30 AM Bretta Gallop, PT [facility]   7/7/2021  8:15 AM Bretta Gallop, PT [facility]   7/8/2021  7:30 AM Bretta Gallop, PT [facility]   7/13/2021  7:30 AM Bretta Gallop, PT [facility]   7/15/2021  7:30 AM Bretta Gallop, PT [facility]

## 2021-06-15 ENCOUNTER — HOSPITAL ENCOUNTER (OUTPATIENT)
Dept: PHYSICAL THERAPY | Age: 36
Discharge: HOME | End: 2021-06-15
Payer: COMMERCIAL

## 2021-06-15 PROCEDURE — 97112 NEUROMUSCULAR REEDUCATION: CPT

## 2021-06-15 PROCEDURE — 97530 THERAPEUTIC ACTIVITIES: CPT

## 2021-06-15 PROCEDURE — 20561 NDL INSJ W/O NJX 3+ MUSC: CPT

## 2021-06-15 NOTE — PROGRESS NOTES
PHYSICAL THERAPY - DAILY TREATMENT NOTE    Patient Name: Eber Wyatt        Date: 6/15/2021  : 1985   YES Patient  Verified  Visit #:   3   of   12  Insurance: Payor: Michael Quesada / Plan: Enrique Doran / Product Type: Commerical /      In time: 730 Out time: 850   Total Treatment Time: 70     BCBS/Medicare Time Tracking (below)   Total Timed Codes (min):   1:1 Treatment Time:       TREATMENT AREA =  Neck pain on right side [M54.2]  Right shoulder pain [M25.511]    SUBJECTIVE  Pain Level (on 0 to 10 scale):  4-5   10   Medication Changes/New allergies or changes in medical history, any new surgeries or procedures?     NO    If yes, update Summary List   Subjective Functional Status/Changes:  []  No changes reported     I noticed more mobility in my shoulder after the needling and I didn't have as much swelling in my hand when it was really humid         Modalities Rationale:     decrease inflammation, decrease pain and increase tissue extensibility to improve patient's ability to perform ADLs   min [] Estim, type/location:                                      []  att     []  unatt     []  w/US     []  w/ice    []  w/heat    min []  Mechanical Traction: type/lbs                   []  pro   []  sup   []  int   []  cont    []  before manual    []  after manual    min []  Ultrasound, settings/location:      min []  Iontophoresis w/ dexamethasone, location:                                               []  take home patch       []  in clinic   10 min []  Ice     [x]  Heat    location/position: R C/S and shoulder in supine    min []  Vasopneumatic Device, press/temp:     min []  Other:    [x] Skin assessment post-treatment (if applicable):    [x]  intact    [x]  redness- no adverse reaction     []redness  adverse reaction:         min Therapeutic Exercise:  [x]  See flow sheet   Rationale:      increase ROM and increase strength to improve the patients ability to perform unlimited ADLs min Manual Therapy: Technique:      [] S/DTM []IASTM []PROM  [] Passive Stretching  [x]manual TPR  []Jt manipulation:Gr I [] II []  III [] IV[] V[]  Treatment/Area:  Diaphragm release in supine   Rationale:      decrease pain, increase ROM, increase tissue extensibility and decrease trigger points to improve patient's ability to perform ADLs  The manual therapy interventions were performed at a separate and distinct time from the therapeutic activities interventions. 15 min Dry Needling:   Rationale:      decrease pain, increase ROM, increase tissue extensibility, and decrease trigger points to improve patient's ability to perform ADLs and dec HA  Select one untimed code below based on number of muscle groups needled:  []  CPT 65762:  needle insertion(s) without injection(s); 1 or 2 muscle(s)  [x]  CPT 96881:  needle insertion(s) without injection(s); 3 or more muscles  Dry Needling Procedure Note    Dry Needle Session Number:  2    Procedure: An intramuscular manual therapy (dry needling) and a neuro-muscular re-education treatment was done to deactivate myofascial trigger points, with a 15/30 gauge solid filament needle, under aseptic technique. Indication(s): [] Muscle spasms [] Myalgia/Myositis  [] Muscle cramps      [] Muscle imbalances [] TMD (TMJ) [] Myofascial pain & dysfunction     [] Other: __    Chart reviewed for the following:  Amira NARVAEZ, PT, have reviewed the medical history, summary list and precautions/contraindications for [employer].      TIME OUT performed immediately prior to start of procedure:  740am (enter time the timeout was completed)  Amira NARVAEZ, PT, have performed the following reviews on [employer] prior to the start of the session:      [x] Patient was identified by name and date of birth    [x] Agreement on all muscles being treated was verified   [x] Purpose of dry needling, side effects, possible complications, risks and benefits were explained to the patient   [x] Procedure site(s) verified  [x] Patient was positioned for comfort and draped for privacy  [x] Informed Consent was signed (initial visit) and verified verbally (subsequent visits)  [x] Patient was instructed on the need to report the use of blood thinners and/or immunosuppressant medications  [x] How to respond to possible adverse effects of treatment  [x] Self treatment of post needling soreness: ice, heat (moist heat, heat wraps) and stretching  [x] Opportunity was given to ask any questions, all questions were answered            Treatment:  The following muscles were treated today:    Right: UT, infraspinatus, teres major/minor, rhomboids, SCM and scalenes   Left:      Patients response to todays treatment:   [x]  LTRs  []  Muscle Relaxation  []  Pain Relief  []  Decreased Tinnitus  []  Decreased HAs [x]  Post needling soreness  []  Increased ROM   []  Other:        15 min Therapeutic Activity: [x]  See flow sheet   Rationale:    increase ROM, increase strength and improve coordination to improve the patients ability to reach, carry, push and lift with R UE    30 min Neuromuscular Re-ed: [x]  See flow sheet   Rationale:    increase ROM, increase strength, improve coordination, improve balance and increase proprioception to improve the patients ability to breathe normally and improve postural awareness    Billed With/As:   [x] TE   [x] TA   [x] Neuro   [] Self Care Patient Education: [x] Review HEP    [] Progressed/Changed HEP based on:   [] positioning   [] body mechanics   [] transfers   [] heat/ice application    [x] other: Issued written HEP and reviewed     Other Objective/Functional Measures:    + LTR elicited to muscles to treated with dry needling technique.  No adverse reactions from [address]    Added weighted prone scap retract, prone ER, sup horz abd and chin tuck with stab cuff   Post Treatment Pain Level (on 0 to 10) scale:   5  / 10     ASSESSMENT  Assessment/Changes in Function:     Poor tolerance to prone ER due to c/o pain with rotation. Improved scapular retraction. Improved recruitment of deep cervical neck flexors with use of stab cuff. Weakness results in compensatory recruitment of SCM     []  See Progress Note/Recertification   Patient will continue to benefit from skilled PT services to modify and progress therapeutic interventions, address functional mobility deficits, address ROM deficits, address strength deficits, analyze and address soft tissue restrictions, analyze and cue movement patterns, analyze and modify body mechanics/ergonomics, assess and modify postural abnormalities, address imbalance/dizziness and instruct in home and community integration to attain remaining goals.    Progress toward goals / Updated goals:    Progressing towards STG1, added additional exercises today     PLAN  [x]  Upgrade activities as tolerated YES Continue plan of care   []  Discharge due to :    []  Other:      Therapist: Alireza Holbrook, PT, DPT, MTC, CMTPT    Date: 6/15/2021 Time: 8:19 AM     Future Appointments   Date Time Provider Dolly Solorio   6/17/2021  8:15 AM Frank Boop, PT [facility]   6/22/2021  7:30 AM Frank Boop, PT [facility]   6/24/2021  7:30 AM Rfank Boop, PT [facility]   6/29/2021  7:30 AM Frank Boop, PT [facility]   7/1/2021  7:30 AM Frank Boop, PT [facility]   7/7/2021  8:15 AM Frank Boop, PT [facility]   7/8/2021  7:30 AM Frank Boop, PT [facility]   7/13/2021  7:30 AM Frank Boop, PT [facility]   7/15/2021  1:30 PM Frank Boop, PT [facility]

## 2021-06-17 ENCOUNTER — APPOINTMENT (OUTPATIENT)
Dept: PHYSICAL THERAPY | Age: 36
End: 2021-06-17
Payer: COMMERCIAL

## 2021-06-22 ENCOUNTER — HOSPITAL ENCOUNTER (OUTPATIENT)
Dept: PHYSICAL THERAPY | Age: 36
Discharge: HOME | End: 2021-06-22
Payer: COMMERCIAL

## 2021-06-22 PROCEDURE — 97110 THERAPEUTIC EXERCISES: CPT

## 2021-06-22 PROCEDURE — 97530 THERAPEUTIC ACTIVITIES: CPT

## 2021-06-22 PROCEDURE — 20561 NDL INSJ W/O NJX 3+ MUSC: CPT

## 2021-06-22 PROCEDURE — 97112 NEUROMUSCULAR REEDUCATION: CPT

## 2021-06-22 NOTE — PROGRESS NOTES
PHYSICAL THERAPY - DAILY TREATMENT NOTE    Patient Name: Silke Valentino        Date: 2021  : 1985   YES Patient  Verified  Visit #:      of   12  Insurance: Payor: Arpita Zapien / Plan: [place] / Product Type: Commerical /      In time: 730 Out time: 840   Total Treatment Time: 65     BCBS/Medicare Time Tracking (below)   Total Timed Codes (min):   1:1 Treatment Time:       TREATMENT AREA =  Neck pain on right side [M54.2]  Right shoulder pain [M25.511]    SUBJECTIVE  Pain Level (on 0 to 10 scale):  3  / 10   Medication Changes/New allergies or changes in medical history, any new surgeries or procedures? NO    If yes, update Summary List   Subjective Functional Status/Changes:  []  No changes reported     More shoulder mobility and less swelling in my hand.         Modalities Rationale:     decrease inflammation, decrease pain and increase tissue extensibility to improve patient's ability to perform ADLs   min [] Estim, type/location:                                      []  att     []  unatt     []  w/US     []  w/ice    []  w/heat    min []  Mechanical Traction: type/lbs                   []  pro   []  sup   []  int   []  cont    []  before manual    []  after manual    min []  Ultrasound, settings/location:      min []  Iontophoresis w/ dexamethasone, location:                                               []  take home patch       []  in clinic   PD min []  Ice     [x]  Heat    location/position:     min []  Vasopneumatic Device, press/temp:     min []  Other:    [x] Skin assessment post-treatment (if applicable):    [x]  intact    [x]  redness- no adverse reaction     []redness  adverse reaction:        20 min Therapeutic Exercise:  [x]  See flow sheet   Rationale:      increase ROM and increase strength to improve the patients ability to perform unlimited ADLs      min Manual Therapy: Technique:      [] S/DTM []IASTM []PROM  [] Passive Stretching  [x]manual TPR  []Jt manipulation:Gr I [] II []  III [] IV[] V[]  Treatment/Area:  Diaphragm release in supine   Rationale:      decrease pain, increase ROM, increase tissue extensibility and decrease trigger points to improve patient's ability to perform ADLs  The manual therapy interventions were performed at a separate and distinct time from the therapeutic activities interventions. 15 min Dry Needling:   Rationale:      decrease pain, increase ROM, increase tissue extensibility, and decrease trigger points to improve patient's ability to perform ADLs and dec HA  Select one untimed code below based on number of muscle groups needled:  []  CPT 32527:  needle insertion(s) without injection(s); 1 or 2 muscle(s)  [x]  CPT 17930:  needle insertion(s) without injection(s); 3 or more muscles  Dry Needling Procedure Note    Dry Needle Session Number:  3    Procedure: An intramuscular manual therapy (dry needling) and a neuro-muscular re-education treatment was done to deactivate myofascial trigger points, with a 15/30 gauge solid filament needle, under aseptic technique. Indication(s): [] Muscle spasms [] Myalgia/Myositis  [] Muscle cramps      [] Muscle imbalances [] TMD (TMJ) [] Myofascial pain & dysfunction     [] Other: __    Chart reviewed for the following:  Amira NARVAEZ, PT, have reviewed the medical history, summary list and precautions/contraindications for [employer].      TIME OUT performed immediately prior to start of procedure:  740am (enter time the timeout was completed)  Amira NARVAEZ PT, have performed the following reviews on [employer] prior to the start of the session:      [x] Patient was identified by name and date of birth    [x] Agreement on all muscles being treated was verified   [x] Purpose of dry needling, side effects, possible complications, risks and benefits were explained to the patient   [x] Procedure site(s) verified  [x] Patient was positioned for comfort and draped for privacy  [x] Informed Consent was signed (initial visit) and verified verbally (subsequent visits)  [x] Patient was instructed on the need to report the use of blood thinners and/or immunosuppressant medications  [x] How to respond to possible adverse effects of treatment  [x] Self treatment of post needling soreness: ice, heat (moist heat, heat wraps) and stretching  [x] Opportunity was given to ask any questions, all questions were answered            Treatment:  The following muscles were treated today:    Right: UT, infraspinatus, teres major/minor, SCM and scalenes   Left:      Patients response to todays treatment:   [x]  LTRs  []  Muscle Relaxation  []  Pain Relief  []  Decreased Tinnitus  []  Decreased HAs [x]  Post needling soreness  []  Increased ROM   []  Other:        15 min Therapeutic Activity: [x]  See flow sheet   Rationale:    increase ROM, increase strength and improve coordination to improve the patients ability to reach, carry, push and lift with R UE    15 min Neuromuscular Re-ed: [x]  See flow sheet   Rationale:    increase ROM, increase strength, improve coordination, improve balance and increase proprioception to improve the patients ability to breathe normally and improve postural awareness    Billed With/As:   [x] TE   [x] TA   [x] Neuro   [] Self Care Patient Education: [x] Review HEP    [] Progressed/Changed HEP based on:   [] positioning   [] body mechanics   [] transfers   [] heat/ice application    [x] other: Issued written HEP and reviewed     Other Objective/Functional Measures:    + LTR elicited to muscles to treated with dry needling technique.  [facility] reactions from [facility]    Added T/S mobility exercises   Post Treatment Pain Level (on 0 to 10) scale:   3  / 10     ASSESSMENT  Assessment/Changes in Function:     Good release of TP throughout and significant improvement in mm quality/tone allowing for full TP release without excessive residual swelling     []  See Progress Note/Recertification   Patient will continue to benefit from skilled PT services to modify and progress therapeutic interventions, address functional mobility deficits, address ROM deficits, address strength deficits, analyze and address soft tissue restrictions, analyze and cue movement patterns, analyze and modify body mechanics/ergonomics, assess and modify postural abnormalities, address imbalance/dizziness and instruct in home and community integration to attain remaining goals.    Progress toward goals / Updated goals:    Met STG1     PLAN  [x]  Upgrade activities as tolerated YES Continue plan of care   []  Discharge due to :    []  Other:      Therapist: Elver Long, PT, DPT, MTC, CMTPT    Date: 6/22/2021 Time: 8:19 AM     Future Appointments   Date Time Provider Dolly Solorio   6/24/2021  7:30 AM Deb Pastrana, PT [facility]   6/29/2021  7:30 AM Deb Pastrana, PT [facility]   7/1/2021  7:30 AM Deb Pastrana, PT [facility]   7/7/2021  8:15 AM Deb Pastrana, PT [facility]   7/8/2021  7:30 AM Deb Pastrana, PT [facility]   7/13/2021  7:30 AM Deb Pastrana, PT [facility]   7/15/2021  1:30 PM Deb Pastrana, PT [facility]

## 2021-06-24 ENCOUNTER — HOSPITAL ENCOUNTER (OUTPATIENT)
Dept: PHYSICAL THERAPY | Age: 36
Discharge: HOME | End: 2021-06-24
Payer: COMMERCIAL

## 2021-06-24 PROCEDURE — 97530 THERAPEUTIC ACTIVITIES: CPT

## 2021-06-24 PROCEDURE — 97112 NEUROMUSCULAR REEDUCATION: CPT

## 2021-06-24 PROCEDURE — 97110 THERAPEUTIC EXERCISES: CPT

## 2021-06-24 NOTE — PROGRESS NOTES
PHYSICAL THERAPY - DAILY TREATMENT NOTE    Patient Name: Maria Victoria Gonzalez        Date: 2021  : 1985   YES Patient  Verified  Visit #:      12  Insurance: Payor: Jackson Fuchs / Plan: Eliecer Cowan / Product Type: Commerical /      In time: 730 Out time: 825   Total Treatment Time: 50     BCBS/Medicare Time Tracking (below)   Total Timed Codes (min):   1:1 Treatment Time:       TREATMENT AREA =  Neck pain on right side [M54.2]  Right shoulder pain [M25.511]    SUBJECTIVE  Pain Level (on 0 to 10 scale):  3  / 10   Medication Changes/New allergies or changes in medical history, any new surgeries or procedures?     NO    If yes, update Summary List   Subjective Functional Status/Changes:  []  No changes reported     Always have popping in my shoulder when I raise my arm up and it goes out to the side         Modalities Rationale:     decrease inflammation, decrease pain and increase tissue extensibility to improve patient's ability to perform ADLs   min [] Estim, type/location:                                      []  att     []  unatt     []  w/US     []  w/ice    []  w/heat    min []  Mechanical Traction: type/lbs                   []  pro   []  sup   []  int   []  cont    []  before manual    []  after manual    min []  Ultrasound, settings/location:      min []  Iontophoresis w/ dexamethasone, location:                                               []  take home patch       []  in clinic   PD min []  Ice     [x]  Heat    location/position:     min []  Vasopneumatic Device, press/temp:     min []  Other:    [x] Skin assessment post-treatment (if applicable):    [x]  intact    [x]  redness- no adverse reaction     []redness  adverse reaction:        10 min Therapeutic Exercise:  [x]  See flow sheet   Rationale:      increase ROM and increase strength to improve the patients ability to perform unlimited ADLs     15 min Therapeutic Activity: [x]  See flow sheet   Rationale: increase ROM, increase strength and improve coordination to improve the patients ability to reach, carry, push and lift with R UE    25 min Neuromuscular Re-ed: [x]  See flow sheet   Rationale:    increase ROM, increase strength, improve coordination, improve balance and increase proprioception to improve the patients ability to breathe normally and improve postural awareness    Billed With/As:   [x] TE   [x] TA   [x] Neuro   [] Self Care Patient Education: [x] Review HEP    [] Progressed/Changed HEP based on:   [] positioning   [] body mechanics   [] transfers   [] heat/ice application    [x] other: Issued written HEP and reviewed     Other Objective/Functional Measures:    See FS  Added lower trap and serratus focused scapular stabilization and re-education exercises    Crepitus along AC jt with elevation nearing end range   Post Treatment Pain Level (on 0 to 10) scale:   3  / 10     ASSESSMENT  Assessment/Changes in Function:     Cueing for proper scapular rhythm and correct interscapular mm firing pattern to avoid excessive UT recruitment. Cueing to maintain neutral C/S when upright.     []  See Progress Note/Recertification   Patient will continue to benefit from skilled PT services to modify and progress therapeutic interventions, address functional mobility deficits, address ROM deficits, address strength deficits, analyze and address soft tissue restrictions, analyze and cue movement patterns, analyze and modify body mechanics/ergonomics, assess and modify postural abnormalities, address imbalance/dizziness and instruct in home and community integration to attain remaining goals.    Progress toward goals / Updated goals:    Met STG1, progressing towards STG3     PLAN  [x]  Upgrade activities as tolerated YES Continue plan of care   []  Discharge due to :    []  Other:      Therapist: Cynthia Lockett, PT, DPT, MTC, CMTPT    Date: 6/24/2021 Time: 8:19 AM     Future Appointments   Date Time Provider Department [place]   6/29/2021  7:30 AM Gloria Odom, PT [facility]   7/1/2021  7:30 AM Gloria Odom, PT [facility]   7/7/2021  8:15 AM Gloria Odom, PT [facility]   7/8/2021  7:30 AM Gloria Odom, PT [facility]   7/13/2021  7:30 AM Gloria Odom, PT [facility]   7/15/2021  1:30 PM Gloria Odom, PT [facility]

## 2021-06-29 ENCOUNTER — HOSPITAL ENCOUNTER (OUTPATIENT)
Dept: PHYSICAL THERAPY | Age: 36
Discharge: HOME | End: 2021-06-29
Payer: COMMERCIAL

## 2021-06-29 PROCEDURE — 20561 NDL INSJ W/O NJX 3+ MUSC: CPT

## 2021-06-29 PROCEDURE — 97530 THERAPEUTIC ACTIVITIES: CPT

## 2021-06-29 PROCEDURE — 97110 THERAPEUTIC EXERCISES: CPT

## 2021-06-29 NOTE — PROGRESS NOTES
PHYSICAL THERAPY - DAILY TREATMENT NOTE    Patient Name: Terrence Mireles        Date: 2021  : 1985   YES Patient  Verified  Visit #:     Insurance: Payor: Olamide Pickup / Plan: Nathanael Balding / Product Type: Commerical /      In time: 730 Out time: 820   Total Treatment Time: 50     BCBS/Medicare Time Tracking (below)   Total Timed Codes (min):   1:1 Treatment Time:       TREATMENT AREA =  Neck pain on right side [M54.2]  Right shoulder pain [M25.511]    SUBJECTIVE  Pain Level (on 0 to 10 scale):  3  / 10   Medication Changes/New allergies or changes in medical history, any new surgeries or procedures? NO    If yes, update Summary List   Subjective Functional Status/Changes:  []  No changes reported     I can finally feel my shoulders moving back and dont have to raise them up and then roll down anymore to engage the muscles.          Modalities Rationale:     decrease inflammation, decrease pain and increase tissue extensibility to improve patient's ability to perform ADLs   min [] Estim, type/location:                                      []  att     []  unatt     []  w/US     []  w/ice    []  w/heat    min []  Mechanical Traction: type/lbs                   []  pro   []  sup   []  int   []  cont    []  before manual    []  after manual    min []  Ultrasound, settings/location:      min []  Iontophoresis w/ dexamethasone, location:                                               []  take home patch       []  in clinic   PD min []  Ice     [x]  Heat    location/position:     min []  Vasopneumatic Device, press/temp:     min []  Other:    [x] Skin assessment post-treatment (if applicable):    [x]  intact    [x]  redness- no adverse reaction     []redness  adverse reaction:        15 min Therapeutic Exercise:  [x]  See flow sheet   Rationale:      increase ROM and increase strength to improve the patients ability to perform unlimited ADLs      min Manual Therapy: Technique:      [] S/DTM []IASTM []PROM  [] Passive Stretching  [x]manual TPR  []Jt manipulation:Gr I [] II []  III [] IV[] V[]  Treatment/Area:  Diaphragm release in supine   Rationale:      decrease pain, increase ROM, increase tissue extensibility and decrease trigger points to improve patient's ability to perform ADLs  The manual therapy interventions were performed at a separate and distinct time from the therapeutic activities interventions. 10 min Dry Needling:   Rationale:      decrease pain, increase ROM, increase tissue extensibility, and decrease trigger points to improve patient's ability to perform ADLs and dec HA  Select one untimed code below based on number of muscle groups needled:  []  CPT 95272:  needle insertion(s) without injection(s); 1 or 2 muscle(s)  [x]  CPT 45214:  needle insertion(s) without injection(s); 3 or more muscles  Dry Needling Procedure Note    Dry Needle Session Number: 4    Procedure: An intramuscular manual therapy (dry needling) and a neuro-muscular re-education treatment was done to deactivate myofascial trigger points, with a 15/30 gauge solid filament needle, under aseptic technique. Indication(s): [] Muscle spasms [] Myalgia/Myositis  [] Muscle cramps      [] Muscle imbalances [] TMD (TMJ) [] Myofascial pain & dysfunction     [] Other: __    Chart reviewed for the following:  Amira NARVAEZ, PT, have reviewed the medical history, summary list and precautions/contraindications for [employer].      TIME OUT performed immediately prior to start of procedure:  740am (enter time the timeout was completed)  Amira NARVAEZ, PT, have performed the following reviews on [employer] prior to the start of the session:      [x] Patient was identified by name and date of birth    [x] Agreement on all muscles being treated was verified   [x] Purpose of dry needling, side effects, possible complications, risks and benefits were explained to the patient [x] Procedure site(s) verified  [x] Patient was positioned for comfort and draped for privacy  [x] Informed Consent was signed (initial visit) and verified verbally (subsequent visits)  [x] Patient was instructed on the need to report the use of blood thinners and/or immunosuppressant medications  [x] How to respond to possible adverse effects of treatment  [x] Self treatment of post needling soreness: ice, heat (moist heat, heat wraps) and stretching  [x] Opportunity was given to ask any questions, all questions were answered            Treatment:  The following muscles were treated today:    Right: UT, infraspinatus, rhomboids and lev scap, SCM and scalenes   Left:      Patients response to todays treatment:   [x]  LTRs  []  Muscle Relaxation  []  Pain Relief  []  Decreased Tinnitus  []  Decreased HAs [x]  Post needling soreness  []  Increased ROM   []  Other:        25 min Therapeutic Activity: [x]  See flow sheet   Rationale:    increase ROM, increase strength and improve coordination to improve the patients ability to reach, carry, push and lift with R UE      Billed With/As:   [x] TE   [x] TA   [] Neuro   [] Self Care Patient Education: [x] Review HEP    [] Progressed/Changed HEP based on:   [] positioning   [] body mechanics   [] transfers   [] heat/ice application    [x] other: nerve glide 2 way up to x10 per tolerance. Reviewed scapular positioning and proper mechanics when reaching and sitting. Other Objective/Functional Measures:    + LTR elicited to muscles to treated with dry needling technique. No adverse reactions from [address] [place] [address] 153    + R ULTT and ulnar N bias   Post Treatment Pain Level (on 0 to 10) scale:   3  / 10     ASSESSMENT  Assessment/Changes in Function:     Significant improvement in MF mobility of all DN treated mm. Good release of SCM and able to DN lev scapulae due to release of UT.  Improved postural awareness and ability to self correct collapsed posture     []  See Progress Note/Recertification   Patient will continue to benefit from skilled PT services to modify and progress therapeutic interventions, address functional mobility deficits, address ROM deficits, address strength deficits, analyze and address soft tissue restrictions, analyze and cue movement patterns, analyze and modify body mechanics/ergonomics, assess and modify postural abnormalities, address imbalance/dizziness and instruct in home and community integration to attain remaining goals.    Progress toward goals / Updated goals:    Partially met STG2, improved ROM but cont c/o ERP into flex/abd     PLAN  [x]  Upgrade activities as tolerated YES Continue plan of care   []  Discharge due to :    []  Other:      Therapist: Juan Fong, PT, DPT, MTC, CMTPT    Date: 6/29/2021 Time: 8:19 AM     Future Appointments   Date Time Provider Dolly Solorio   7/1/2021  7:30 AM Thurl Saver, PT [facility]   7/7/2021  8:15 AM Thurl Saver, PT [facility]   7/8/2021  7:30 AM Thurl Saver, PT [facility]   7/13/2021  7:30 AM Thurl Saver, PT [facility]   7/15/2021  1:30 PM Thurl Saver, PT [facility]

## 2021-07-01 ENCOUNTER — HOSPITAL ENCOUNTER (OUTPATIENT)
Dept: PHYSICAL THERAPY | Age: 36
Discharge: HOME | End: 2021-07-01
Payer: COMMERCIAL

## 2021-07-01 PROCEDURE — 97112 NEUROMUSCULAR REEDUCATION: CPT

## 2021-07-01 PROCEDURE — 97110 THERAPEUTIC EXERCISES: CPT

## 2021-07-01 PROCEDURE — 97530 THERAPEUTIC ACTIVITIES: CPT

## 2021-07-01 NOTE — PROGRESS NOTES
PHYSICAL THERAPY - DAILY TREATMENT NOTE    Patient Name: Myles Cabrera        Date: 2021  : 1985   YES Patient  Verified  Visit #:     Insurance: Payor: Jhonathan Rocha / Plan: Magdalena Corado / Product Type: Commerical /      In time: 730 Out time: 835   Total Treatment Time: 60     BCBS/Medicare Time Tracking (below)   Total Timed Codes (min):   1:1 Treatment Time:       TREATMENT AREA =  Neck pain on right side [M54.2]  Right shoulder pain [M25.511]    SUBJECTIVE  Pain Level (on 0 to 10 scale):  3  / 10   Medication Changes/New allergies or changes in medical history, any new surgeries or procedures? NO    If yes, update Summary List   Subjective Functional Status/Changes:  []  No changes reported     Have made some progress on the regular nerve glide.  My shoulder feels unstable the higher I reach      Modalities Rationale:     decrease inflammation, decrease pain and increase tissue extensibility to improve patient's ability to perform ADLs   min [] Estim, type/location:                                      []  att     []  unatt     []  w/US     []  w/ice    []  w/heat    min []  Mechanical Traction: type/lbs                   []  pro   []  sup   []  int   []  cont    []  before manual    []  after manual    min []  Ultrasound, settings/location:      min []  Iontophoresis w/ dexamethasone, location:                                               []  take home patch       []  in clinic   PD min []  Ice     [x]  Heat    location/position:     min []  Vasopneumatic Device, press/temp:     min []  Other:    [x] Skin assessment post-treatment (if applicable):    [x]  intact    [x]  redness- no adverse reaction     []redness  adverse reaction:        15 min Therapeutic Exercise:  [x]  See flow sheet   Rationale:      increase ROM and increase strength to improve the patients ability to perform unlimited ADLs     30 min Therapeutic Activity: [x]  See flow sheet Rationale:    increase ROM, increase strength and improve coordination to improve the patients ability to reach, carry, push and lift with R UE    15 min Neuromuscular Re-ed: [x]  See flow sheet   Rationale:    increase ROM, increase strength, improve coordination, improve balance and increase proprioception to improve the patients ability to breathe normally and improve postural awareness    Billed With/As:   [x] TE   [x] TA   [x] Neuro   [] Self Care Patient Education: [x] Review HEP    [] Progressed/Changed HEP based on:   [] positioning   [] body mechanics   [] transfers   [] heat/ice application    [x] other: Issued written HEP and reviewed     Other Objective/Functional Measures:    See FS, added t/s self mob over wedge, tband IR/ER, body blade, scaption, S/L abd and ER   Post Treatment Pain Level (on 0 to 10) scale:   2-3  / 10     ASSESSMENT  Assessment/Changes in Function:     Improved T/S ext following rotational stretch. Cueing for GHJ stability and eccentric scapular control during reaching movements. ER quick to fatigue. []  See Progress Note/Recertification   Patient will continue to benefit from skilled PT services to modify and progress therapeutic interventions, address functional mobility deficits, address ROM deficits, address strength deficits, analyze and address soft tissue restrictions, analyze and cue movement patterns, analyze and modify body mechanics/ergonomics, assess and modify postural abnormalities, address imbalance/dizziness and instruct in home and community integration to attain remaining goals.    Progress toward goals / Updated goals:    Progressing towards LTG1     PLAN  [x]  Upgrade activities as tolerated YES Continue plan of care   []  Discharge due to :    []  Other:      Therapist: Jaye Rice PT, DPT, MTC, CMTPT    Date: 7/1/2021 Time: 8:19 AM     Future Appointments   Date Time Provider Dolly Solorio   7/7/2021  8:15 AM [facility] 7/8/2021  7:30 AM David Rahman, PT [facility]   7/13/2021  7:30 AM David Rahman, PT [facility]   7/15/2021  1:30 PM David Rahman PT [facility]

## 2021-07-07 ENCOUNTER — HOSPITAL ENCOUNTER (OUTPATIENT)
Dept: PHYSICAL THERAPY | Age: 36
Discharge: HOME | End: 2021-07-07
Payer: COMMERCIAL

## 2021-07-07 PROCEDURE — 97110 THERAPEUTIC EXERCISES: CPT

## 2021-07-07 PROCEDURE — 97530 THERAPEUTIC ACTIVITIES: CPT

## 2021-07-07 PROCEDURE — 20561 NDL INSJ W/O NJX 3+ MUSC: CPT

## 2021-07-07 NOTE — PROGRESS NOTES
PHYSICAL THERAPY - DAILY TREATMENT NOTE    Patient Name: Donna Hartman        Date: 2021  : 1985   YES Patient  Verified  Visit #:     Insurance: Payor: [place] / Plan: Raul Powell / Product Type: Commerical /      In time: 815 Out time: 910   Total Treatment Time: 50     BCBS/Medicare Time Tracking (below)   Total Timed Codes (min):   1:1 Treatment Time:       TREATMENT AREA =  Neck pain on right side [M54.2]  Right shoulder pain [M25.511]    SUBJECTIVE  Pain Level (on 0 to 10 scale):  2-3  / 10   Medication Changes/New allergies or changes in medical history, any new surgeries or procedures? NO    If yes, update Summary List   Subjective Functional Status/Changes:  []  No changes reported     I can finally feel my shoulders moving back and dont have to raise them up and then roll down anymore to engage the muscles.          Modalities Rationale:     decrease inflammation, decrease pain and increase tissue extensibility to improve patient's ability to perform ADLs   min [] Estim, type/location:                                      []  att     []  unatt     []  w/US     []  w/ice    []  w/heat    min []  Mechanical Traction: type/lbs                   []  pro   []  sup   []  int   []  cont    []  before manual    []  after manual    min []  Ultrasound, settings/location:      min []  Iontophoresis w/ dexamethasone, location:                                               []  take home patch       []  in clinic   PD min []  Ice     [x]  Heat    location/position:     min []  Vasopneumatic Device, press/temp:     min []  Other:    [x] Skin assessment post-treatment (if applicable):    [x]  intact    [x]  redness- no adverse reaction     []redness  adverse reaction:        15 min Therapeutic Exercise:  [x]  See flow sheet   Rationale:      increase ROM and increase strength to improve the patients ability to perform unlimited ADLs       10 min Dry Needling: Rationale:      decrease pain, increase ROM, increase tissue extensibility, and decrease trigger points to improve patient's ability to perform ADLs and dec HA  Select one untimed code below based on number of muscle groups needled:  []  CPT 40536:  needle insertion(s) without injection(s); 1 or 2 muscle(s)  [x]  CPT 21972:  needle insertion(s) without injection(s); 3 or more muscles  Dry Needling Procedure Note    Dry Needle Session Number: 5    Procedure: An intramuscular manual therapy (dry needling) and a neuro-muscular re-education treatment was done to deactivate myofascial trigger points, with a 15/30 gauge solid filament needle, under aseptic technique. Indication(s): [] Muscle spasms [] Myalgia/Myositis  [] Muscle cramps      [] Muscle imbalances [] TMD (TMJ) [] Myofascial pain & dysfunction     [] Other: __    Chart reviewed for the following:  IAmira, PT, have reviewed the medical history, summary list and precautions/contraindications for [employer].      TIME OUT performed immediately prior to start of procedure:  840am (enter time the timeout was completed)  Amira NARVAEZ, PT, have performed the following reviews on [employer] prior to the start of the session:      [x] Patient was identified by name and date of birth    [x] Agreement on all muscles being treated was verified   [x] Purpose of dry needling, side effects, possible complications, risks and benefits were explained to the patient   [x] Procedure site(s) verified  [x] Patient was positioned for comfort and draped for privacy  [x] Informed Consent was signed (initial visit) and verified verbally (subsequent visits)  [x] Patient was instructed on the need to report the use of blood thinners and/or immunosuppressant medications  [x] How to respond to possible adverse effects of treatment  [x] Self treatment of post needling soreness: ice, heat (moist heat, heat wraps) and stretching  [x] Opportunity was given to ask any questions, all questions were answered            Treatment:  The following muscles were treated today:    Right: UT, infraspinatus, rhomboids and lev scap, SCM and scalenes   Left:      Patients response to todays treatment:   [x]  LTRs  []  Muscle Relaxation  []  Pain Relief  []  Decreased Tinnitus  []  Decreased HAs [x]  Post needling soreness  []  Increased ROM   []  Other:        25 min Therapeutic Activity: [x]  See flow sheet   Rationale:    increase ROM, increase strength and improve coordination to improve the patients ability to reach, carry, push and lift with R UE      Billed With/As:   [x] TE   [x] TA   [] Neuro   [] Self Care Patient Education: [x] Review HEP    [] Progressed/Changed HEP based on:   [] positioning   [] body mechanics   [] transfers   [] heat/ice application    [x] other: nerve glide 2 way up to x10 per tolerance. Reviewed scapular positioning and proper mechanics when reaching and sitting. Other Objective/Functional Measures:    + LTR elicited to muscles to treated with dry needling technique. No adverse reactions from [address] [place] [address] 153    + R ULTT and ulnar N bias   Post Treatment Pain Level (on 0 to 10) scale:   2-3  / 10     ASSESSMENT  Assessment/Changes in Function:     Significant improvement in MF mobility of all DN treated mm. Good release of SCM and able to DN lev scapulae due to release of UT.  Improved postural awareness and ability to self correct collapsed posture     []  See Progress Note/Recertification   Patient will continue to benefit from skilled PT services to modify and progress therapeutic interventions, address functional mobility deficits, address ROM deficits, address strength deficits, analyze and address soft tissue restrictions, analyze and cue movement patterns, analyze and modify body mechanics/ergonomics, assess and modify postural abnormalities, address imbalance/dizziness and instruct in home and community integration to attain remaining goals.   Progress toward goals / Updated goals:    Partially met STG2, improved ROM but cont c/o ERP into flex/abd     PLAN  [x]  Upgrade activities as tolerated YES Continue plan of care   []  Discharge due to :    []  Other:      Therapist: Apolinar Adam, PT, DPT, MTC, CMTPT    Date: 7/7/2021 Time: 8:19 AM     Future Appointments   Date Time Provider Dolly Lyndsey   7/8/2021  7:30 AM Tova Tejeda, PT [facility]   7/13/2021  7:30 AM Tova Tejeda, PT [facility]   7/15/2021  1:30 PM Tova Tejeda, PT [facility]   7/20/2021  7:30 AM Tova Tejeda, PT [facility]   7/27/2021  7:30 AM Tova Tejeda, PT [facility]   8/5/2021  7:30 AM Tova Tejeda, PT [facility]   8/10/2021  7:30 AM Tova Tejeda, PT [facility]   8/17/2021  7:30 AM Tova Tejeda, PT [facility]   8/24/2021  7:30 AM Tova Tejeda, PT [facility]   8/31/2021  7:30 AM Tova Tejeda, PT [facility]

## 2021-07-08 ENCOUNTER — APPOINTMENT (OUTPATIENT)
Dept: PHYSICAL THERAPY | Age: 36
End: 2021-07-08
Payer: COMMERCIAL

## 2021-07-13 ENCOUNTER — HOSPITAL ENCOUNTER (OUTPATIENT)
Dept: PHYSICAL THERAPY | Age: 36
Discharge: HOME | End: 2021-07-13
Payer: COMMERCIAL

## 2021-07-13 PROCEDURE — 97110 THERAPEUTIC EXERCISES: CPT

## 2021-07-13 PROCEDURE — 97530 THERAPEUTIC ACTIVITIES: CPT

## 2021-07-13 PROCEDURE — 20560 NDL INSJ W/O NJX 1 OR 2 MUSC: CPT

## 2021-07-13 NOTE — PROGRESS NOTES
PHYSICAL THERAPY - DAILY TREATMENT NOTE    Patient Name: Terrence Mireles        Date: 2021  : 1985   YES Patient  Verified  Visit #:     Insurance: Payor: [employer] / Plan: Nathanael Balding / Product Type: Commerical /      In time: 730 Out time: 835   Total Treatment Time: 60     BCBS/Medicare Time Tracking (below)   Total Timed Codes (min):   1:1 Treatment Time:       TREATMENT AREA =  Neck pain on right side [M54.2]  Right shoulder pain [M25.511]    SUBJECTIVE  Pain Level (on 0 to 10 scale):  3  / 10   Medication Changes/New allergies or changes in medical history, any new surgeries or procedures? NO    If yes, update Summary List   Subjective Functional Status/Changes:  []  No changes reported     Chest felt looser and I didn't have to stretch as much.  Still making progress on the nerve glide        Modalities Rationale:     decrease inflammation, decrease pain and increase tissue extensibility to improve patient's ability to perform ADLs   min [] Estim, type/location:                                      []  att     []  unatt     []  w/US     []  w/ice    []  w/heat    min []  Mechanical Traction: type/lbs                   []  pro   []  sup   []  int   []  cont    []  before manual    []  after manual    min []  Ultrasound, settings/location:      min []  Iontophoresis w/ dexamethasone, location:                                               []  take home patch       []  in clinic   PD min []  Ice     [x]  Heat    location/position:     min []  Vasopneumatic Device, press/temp:     min []  Other:    [x] Skin assessment post-treatment (if applicable):    [x]  intact    [x]  redness- no adverse reaction     []redness  adverse reaction:        20 min Therapeutic Exercise:  [x]  See flow sheet   Rationale:      increase ROM and increase strength to improve the patients ability to perform unlimited ADLs     15 min Dry Needling:   Rationale:      decrease pain, increase ROM, increase tissue extensibility, and decrease trigger points to improve patient's ability to perform ADLs and dec HA  Select one untimed code below based on number of muscle groups needled:  []  CPT 64026:  needle insertion(s) without injection(s); 1 or 2 muscle(s)  [x]  CPT 35348:  needle insertion(s) without injection(s); 3 or more muscles  Dry Needling Procedure Note    Dry Needle Session Number:  6    Procedure: An intramuscular manual therapy (dry needling) and a neuro-muscular re-education treatment was done to deactivate myofascial trigger points, with a 15/30 gauge solid filament needle, under aseptic technique. Indication(s): [] Muscle spasms [] Myalgia/Myositis  [] Muscle cramps      [] Muscle imbalances [] TMD (TMJ) [] Myofascial pain & dysfunction     [] Other: __    Chart reviewed for the following:  Amira NARVAEZ PT, have reviewed the medical history, summary list and precautions/contraindications for [employer].      TIME OUT performed immediately prior to start of procedure:  745am (enter time the timeout was completed)  Amira NARVAEZ PT, have performed the following reviews on [employer] prior to the start of the session:      [x] Patient was identified by name and date of birth    [x] Agreement on all muscles being treated was verified   [x] Purpose of dry needling, side effects, possible complications, risks and benefits were explained to the patient   [x] Procedure site(s) verified  [x] Patient was positioned for comfort and draped for privacy  [x] Informed Consent was signed (initial visit) and verified verbally (subsequent visits)  [x] Patient was instructed on the need to report the use of blood thinners and/or immunosuppressant medications  [x] How to respond to possible adverse effects of treatment  [x] Self treatment of post needling soreness: ice, heat (moist heat, heat wraps) and stretching  [x] Opportunity was given to ask any questions, all questions were answered            Treatment:  The following muscles were treated today:    Right: UT/lev, SCM, suboccipitals   Left:      Patients response to todays treatment:   [x]  LTRs  []  Muscle Relaxation  []  Pain Relief  []  Decreased Tinnitus  []  Decreased HAs [x]  Post needling soreness  []  Increased ROM   []  Other:        25 min Therapeutic Activity: [x]  See flow sheet   Rationale:    increase ROM, increase strength and improve coordination to improve the patients ability to reach, carry, push and lift with R UE    15 min Neuromuscular Re-ed: [x]  See flow sheet   Rationale:    increase ROM, increase strength, improve coordination, improve balance and increase proprioception to improve the patients ability to breathe normally and improve postural awareness    Billed With/As:   [x] TE   [x] TA   [x] Neuro   [] Self Care Patient Education: [x] Review HEP    [] Progressed/Changed HEP based on:   [x] positioning   [x] body mechanics   [] transfers   [] heat/ice application    [x] other: recommended peanut for self SOR     Other Objective/Functional Measures:    + LTR elicited to muscles to treated with dry needling technique. No adverse reactions from [address] Treatment Pain Level (on 0 to 10) scale:   2  / 10     ASSESSMENT  Assessment/Changes in Function:     Improved tolerance to R UE nerve glide, inc ROM prior to onset of SX and significant dec in SX intensity     []  See Progress Note/Recertification   Patient will continue to benefit from skilled PT services to modify and progress therapeutic interventions, address functional mobility deficits, address ROM deficits, address strength deficits, analyze and address soft tissue restrictions, analyze and cue movement patterns, analyze and modify body mechanics/ergonomics, assess and modify postural abnormalities, address imbalance/dizziness and instruct in home and community integration to attain remaining goals.    Progress toward goals / Updated goals:    Met STG2, partially met LTG1     PLAN  [x]  Upgrade activities as tolerated YES Continue plan of care   []  Discharge due to :    []  Other:      Therapist: Christi Brown, PT, DPT, MTC, CMTPT    Date: 7/13/2021 Time: 8:19 AM     Future Appointments   Date Time Provider Dolly Solorio   7/15/2021  1:30 PM Kira Romp, PT [facility] [place]   7/20/2021  7:30 AM Kira Romp, PT [facility]   7/27/2021  7:30 AM Kira Romp, PT [facility]   8/5/2021  7:30 AM Kira Romp, PT [facility]   8/10/2021  7:30 AM Kira Romp, PT [facility]   8/17/2021  7:30 AM Kira Romp, PT [facility]   8/24/2021  7:30 AM Kira Romp, PT [facility]   8/31/2021  7:30 AM Kira Romp, PT [facility]

## 2021-07-15 ENCOUNTER — HOSPITAL ENCOUNTER (OUTPATIENT)
Dept: PHYSICAL THERAPY | Age: 36
Discharge: HOME | End: 2021-07-15
Payer: COMMERCIAL

## 2021-07-15 PROCEDURE — 97530 THERAPEUTIC ACTIVITIES: CPT

## 2021-07-15 PROCEDURE — 97110 THERAPEUTIC EXERCISES: CPT

## 2021-07-15 PROCEDURE — 97112 NEUROMUSCULAR REEDUCATION: CPT

## 2021-07-15 NOTE — PROGRESS NOTES
PHYSICAL THERAPY - DAILY TREATMENT NOTE    Patient Name: Donna Hartman        Date: 7/15/2021  : 1985   YES Patient  Verified  Visit #:   10   of   12  Insurance: Payor: [place] / Plan: Raul Powell / Product Type: Commerical /      In time: 130 Out time: 240   Total Treatment Time: 60     BCBS/Medicare Time Tracking (below)   Total Timed Codes (min):   1:1 Treatment Time:       TREATMENT AREA =  Neck pain on right side [M54.2]  Right shoulder pain [M25.511]    SUBJECTIVE  Pain Level (on 0 to 10 scale):  3  / 10   Medication Changes/New allergies or changes in medical history, any new surgeries or procedures?     NO    If yes, update Summary List   Subjective Functional Status/Changes:  []  No changes reported     I wasn't able to lift my head off the pillow after LV because my neck is so weak        Modalities Rationale:     decrease inflammation, decrease pain and increase tissue extensibility to improve patient's ability to perform ADLs   min [] Estim, type/location:                                      []  att     []  unatt     []  w/US     []  w/ice    []  w/heat    min []  Mechanical Traction: type/lbs                   []  pro   []  sup   []  int   []  cont    []  before manual    []  after manual    min []  Ultrasound, settings/location:      min []  Iontophoresis w/ dexamethasone, location:                                               []  take home patch       []  in clinic   PD min []  Ice     [x]  Heat    location/position:     min []  Vasopneumatic Device, press/temp:     min []  Other:    [x] Skin assessment post-treatment (if applicable):    [x]  intact    [x]  redness- no adverse reaction     []redness  adverse reaction:        20 min Therapeutic Exercise:  [x]  See flow sheet   Rationale:      increase ROM and increase strength to improve the patients ability to perform unlimited ADLs     25 min Therapeutic Activity: [x]  See flow sheet   Rationale: increase ROM, increase strength and improve coordination to improve the patients ability to reach, carry, push and lift with R UE    15 min Neuromuscular Re-ed: [x]  See flow sheet   Rationale:    increase ROM, increase strength, improve coordination, improve balance and increase proprioception to improve the patients ability to breathe normally and improve postural awareness    Billed With/As:   [x] TE   [x] TA   [x] Neuro   [] Self Care Patient Education: [x] Review HEP    [] Progressed/Changed HEP based on:   [x] positioning   [x] body mechanics   [] transfers   [] heat/ice application    [x] other: side plank, CFM to LH biceps tendon     Other Objective/Functional Measures:    See FS, progressed GHJ WBing/stabilization exercises  R shoulder AROM flex limited to approx 130 degrees (PROM full), scaption WNL, abd catching >90 but able to ER and perform through full ROM     Post Treatment Pain Level (on 0 to 10) scale:   2  / 10     ASSESSMENT  Assessment/Changes in Function:     Deep cerivcal neck flexors quick to fatigue but able to initially maintain nod and lift head without subcranial hyperext and excessive SCM recruitment. Good tolerance to progression of GHJ/R UE WBing needed for pushing, crawling activities     []  See Progress Note/Recertification   Patient will continue to benefit from skilled PT services to modify and progress therapeutic interventions, address functional mobility deficits, address ROM deficits, address strength deficits, analyze and address soft tissue restrictions, analyze and cue movement patterns, analyze and modify body mechanics/ergonomics, assess and modify postural abnormalities, address imbalance/dizziness and instruct in home and community integration to attain remaining goals.    Progress toward goals / Updated goals:    Progressing towards LTG1     PLAN  [x]  Upgrade activities as tolerated YES Continue plan of care   []  Discharge due to :    []  Other:      Therapist: Humaira Alford, PT, DPT, MTC, CMTPT    Date: 7/15/2021 Time: 8:19 AM     Future Appointments   Date Time Provider Dolly Solorio   7/20/2021  7:30 AM Reza Archer, PT [facility]   7/27/2021  7:30 AM Reza Archer, PT [facility]   8/5/2021  7:30 AM Reza Archer, PT [facility]   8/10/2021  7:30 AM Reza Archer, PT [facility]   8/17/2021  7:30 AM Reza Archer, PT [facility]   8/24/2021  7:30 AM Reza Archer, PT [facility]   8/31/2021  7:30 AM Reza Archer, PT [facility]

## 2021-07-20 ENCOUNTER — HOSPITAL ENCOUNTER (OUTPATIENT)
Dept: PHYSICAL THERAPY | Age: 36
Discharge: HOME | End: 2021-07-20
Payer: COMMERCIAL

## 2021-07-20 PROCEDURE — 20560 NDL INSJ W/O NJX 1 OR 2 MUSC: CPT

## 2021-07-20 PROCEDURE — 20561 NDL INSJ W/O NJX 3+ MUSC: CPT

## 2021-07-20 PROCEDURE — 97110 THERAPEUTIC EXERCISES: CPT

## 2021-07-20 PROCEDURE — 97530 THERAPEUTIC ACTIVITIES: CPT

## 2021-07-20 NOTE — PROGRESS NOTES
PHYSICAL THERAPY - DAILY TREATMENT NOTE    Patient Name: Chico Cullen        Date: 2021  : 1985   YES Patient  Verified  Visit #:     Insurance: Payor: [address] [place] / Plan: Mary Kay Maharaj / Product Type: Commerical /      In time: 730 Out time: 830   Total Treatment Time: 55     BCBS/Medicare Time Tracking (below)   Total Timed Codes (min):   1:1 Treatment Time:       TREATMENT AREA =  Neck pain on right side [M54.2]  Right shoulder pain [M25.511]    SUBJECTIVE  Pain Level (on 0 to 10 scale):  4-5   10   Medication Changes/New allergies or changes in medical history, any new surgeries or procedures? NO    If yes, update Summary List   Subjective Functional Status/Changes:  []  No changes reported     Painted 5 doors over the weekend.  My hand is swollen and lots of tightness in my UT and the one spot in my SCM but the flare up would have been much worse before I started PT        Modalities Rationale:     decrease inflammation, decrease pain and increase tissue extensibility to improve patient's ability to perform ADLs   min [] Estim, type/location:                                      []  att     []  unatt     []  w/US     []  w/ice    []  w/heat    min []  Mechanical Traction: type/lbs                   []  pro   []  sup   []  int   []  cont    []  before manual    []  after manual    min []  Ultrasound, settings/location:      min []  Iontophoresis w/ dexamethasone, location:                                               []  take home patch       []  in clinic   PD min []  Ice     [x]  Heat    location/position:     min []  Vasopneumatic Device, press/temp:     min []  Other:    [x] Skin assessment post-treatment (if applicable):    [x]  intact    [x]  redness- no adverse reaction     []redness  adverse reaction:        15 min Therapeutic Exercise:  [x]  See flow sheet   Rationale:      increase ROM and increase strength to improve the patients ability to perform unlimited ADLs     15 min Dry Needling:   Rationale:      decrease pain, increase ROM, increase tissue extensibility, and decrease trigger points to improve patient's ability to perform ADLs and dec HA  Select one untimed code below based on number of muscle groups needled:  [x]  CPT 90054:  needle insertion(s) without injection(s); 1 or 2 muscle(s)  []  CPT 06591:  needle insertion(s) without injection(s); 3 or more muscles  Dry Needling Procedure Note    Dry Needle Session Number:  7    Procedure: An intramuscular manual therapy (dry needling) and a neuro-muscular re-education treatment was done to deactivate myofascial trigger points, with a 15/30 gauge solid filament needle, under aseptic technique. Indication(s): [] Muscle spasms [] Myalgia/Myositis  [] Muscle cramps      [x] Muscle imbalances [] TMD (TMJ) [x] Myofascial pain & dysfunction     [] Other: __    Chart reviewed for the following:  Amira NARVAEZ PT, have reviewed the medical history, summary list and precautions/contraindications for [employer].      TIME OUT performed immediately prior to start of procedure:  745am (enter time the timeout was completed)  Amira NARVAEZ PT, have performed the following reviews on [employer] prior to the start of the session:      [x] Patient was identified by name and date of birth    [x] Agreement on all muscles being treated was verified   [x] Purpose of dry needling, side effects, possible complications, risks and benefits were explained to the patient   [x] Procedure site(s) verified  [x] Patient was positioned for comfort and draped for privacy  [x] Informed Consent was signed (initial visit) and verified verbally (subsequent visits)  [x] Patient was instructed on the need to report the use of blood thinners and/or immunosuppressant medications  [x] How to respond to possible adverse effects of treatment  [x] Self treatment of post needling soreness: ice, heat (moist heat, heat wraps) and stretching  [x] Opportunity was given to ask any questions, all questions were answered            Treatment:  The following muscles were treated today:    Right: UT, supraspinatus, subscap (med and lat sides of scapula), lats   Left:      Patients response to todays treatment:   [x]  LTRs  [x]  Muscle Relaxation  []  Pain Relief  []  Decreased Tinnitus  [x]  Decreased HAs [x]  Post needling soreness  [x]  Increased ROM   []  Other:        25 min Therapeutic Activity: [x]  See flow sheet   Rationale:    increase ROM, increase strength and improve coordination to improve the patients ability to reach, carry, push and lift with R UE     min Neuromuscular Re-ed: [x]  See flow sheet   Rationale:    increase ROM, increase strength, improve coordination, improve balance and increase proprioception to improve the patients ability to breathe normally and improve postural awareness    Billed With/As:   [x] TE   [x] TA   [x] Neuro   [] Self Care Patient Education: [x] Review HEP    [] Progressed/Changed HEP based on:   [x] positioning   [x] body mechanics   [] transfers   [] heat/ice application    [] other:      Other Objective/Functional Measures:    + LTR elicited to muscles to treated with dry needling technique. No adverse reactions from [address]    See FS, added tband ext, shoulder flex and lat stretch at wall     Post Treatment Pain Level (on 0 to 10) scale:   4  / 10     ASSESSMENT  Assessment/Changes in Function:     Good release of TP and improved scapular mobility following DN (dec lateral pull on scapula during OH reaching following).  Inc tension in UT but minimal mm holding     []  See Progress Note/Recertification   Patient will continue to benefit from skilled PT services to modify and progress therapeutic interventions, address functional mobility deficits, address ROM deficits, address strength deficits, analyze and address soft tissue restrictions, analyze and cue movement patterns, analyze and modify body mechanics/ergonomics, assess and modify postural abnormalities, address imbalance/dizziness and instruct in home and community integration to attain remaining goals.    Progress toward goals / Updated goals:    Steady progress towards LTG1 (fully compliant with current HEP)     PLAN  [x]  Upgrade activities as tolerated YES Continue plan of care   []  Discharge due to :    []  Other:      Therapist: Danielle Anderson, PT, DPT, MTC, CMTPT    Date: 7/20/2021 Time: 8:19 AM     Future Appointments   Date Time Provider Dolly Solorio   7/27/2021  7:30 AM Felicia Repine, PT [facility]   8/5/2021  7:30 AM Felicia Repine, PT [facility]   8/10/2021  7:30 AM Felicia Repine, PT [facility]   8/17/2021  7:30 AM Felicia Repine, PT [facility]   8/24/2021  7:30 AM Felicia Repine, PT [facility]   8/31/2021  7:30 AM Felicia Repine, PT [facility]

## 2021-07-27 ENCOUNTER — APPOINTMENT (OUTPATIENT)
Dept: PHYSICAL THERAPY | Age: 36
End: 2021-07-27
Payer: COMMERCIAL

## 2021-08-05 ENCOUNTER — HOSPITAL ENCOUNTER (OUTPATIENT)
Dept: PHYSICAL THERAPY | Age: 36
Discharge: HOME | End: 2021-08-05
Payer: COMMERCIAL

## 2021-08-05 PROCEDURE — 97530 THERAPEUTIC ACTIVITIES: CPT

## 2021-08-05 PROCEDURE — 97110 THERAPEUTIC EXERCISES: CPT

## 2021-08-05 PROCEDURE — 20561 NDL INSJ W/O NJX 3+ MUSC: CPT

## 2021-08-05 NOTE — PROGRESS NOTES
PHYSICAL THERAPY - DAILY TREATMENT NOTE    Patient Name: Raymundo Camacho        Date: 2021  : 1985   YES Patient  Verified  Visit #:     Insurance: Payor: George Thurman / Plan: Trae Cardenas / Product Type: Commerical /      In time: 730 Out time: 825   Total Treatment Time: 50     BCBS/Medicare Time Tracking (below)   Total Timed Codes (min):   1:1 Treatment Time:       TREATMENT AREA =  Neck pain on right side [M54.2]  Right shoulder pain [M25.511]    SUBJECTIVE  Pain Level (on 0 to 10 scale):  4  / 10   Medication Changes/New allergies or changes in medical history, any new surgeries or procedures? NO    If yes, update Summary List   Subjective Functional Status/Changes:  []  No changes reported     My UT is always tight but not as painful as before I started PT.  I only have to wear a strapless bra now the day after needling        Modalities Rationale:     decrease inflammation, decrease pain and increase tissue extensibility to improve patient's ability to perform ADLs   min [] Estim, type/location:                                      []  att     []  unatt     []  w/US     []  w/ice    []  w/heat    min []  Mechanical Traction: type/lbs                   []  pro   []  sup   []  int   []  cont    []  before manual    []  after manual    min []  Ultrasound, settings/location:      min []  Iontophoresis w/ dexamethasone, location:                                               []  take home patch       []  in clinic   10 min []  Ice     [x]  Heat    location/position: R C/S in supine    min []  Vasopneumatic Device, press/temp:     min []  Other:    [x] Skin assessment post-treatment (if applicable):    [x]  intact    [x]  redness- no adverse reaction     []redness  adverse reaction:        10 min Therapeutic Exercise:  [x]  See flow sheet   Rationale:      increase ROM and increase strength to improve the patients ability to perform unlimited ADLs     10 min Dry Needling:   Rationale:      decrease pain, increase ROM, increase tissue extensibility, and decrease trigger points to improve patient's ability to perform ADLs and dec HA  Select one untimed code below based on number of muscle groups needled:  []  CPT 31303:  needle insertion(s) without injection(s); 1 or 2 muscle(s)  [x]  CPT 80409:  needle insertion(s) without injection(s); 3 or more muscles  Dry Needling Procedure Note    Dry Needle Session Number:  8    Procedure: An intramuscular manual therapy (dry needling) and a neuro-muscular re-education treatment was done to deactivate myofascial trigger points, with a 15/30 gauge solid filament needle, under aseptic technique. Indication(s): [] Muscle spasms [] Myalgia/Myositis  [] Muscle cramps      [x] Muscle imbalances [] TMD (TMJ) [x] Myofascial pain & dysfunction     [] Other: __    Chart reviewed for the following:  IAmira, PT, have reviewed the medical history, summary list and precautions/contraindications for [facility].      TIME OUT performed immediately prior to start of procedure:  745am (enter time the timeout was completed)  Amira NARVAEZ PT, have performed the following reviews on [facility] prior to the start of the session:      [x] Patient was identified by name and date of birth    [x] Agreement on all muscles being treated was verified   [x] Purpose of dry needling, side effects, possible complications, risks and benefits were explained to the patient   [x] Procedure site(s) verified  [x] Patient was positioned for comfort and draped for privacy  [x] Informed Consent was signed (initial visit) and verified verbally (subsequent visits)  [x] Patient was instructed on the need to report the use of blood thinners and/or immunosuppressant medications  [x] How to respond to possible adverse effects of treatment  [x] Self treatment of post needling soreness: ice, heat (moist heat, heat wraps) and stretching  [x] Opportunity was given to ask any questions, all questions were answered            Treatment:  The following muscles were treated today:    Right: SCM, UT/MT, rhomboids   Left:      Patients response to todays treatment:   [x]  LTRs  [x]  Muscle Relaxation  []  Pain Relief  []  Decreased Tinnitus  [x]  Decreased HAs [x]  Post needling soreness  [x]  Increased ROM   []  Other:        20 min Therapeutic Activity: [x]  See flow sheet   Rationale:    increase ROM, increase strength and improve coordination to improve the patients ability to reach, carry, push and lift with R UE     min Neuromuscular Re-ed: [x]  See flow sheet   Rationale:    increase ROM, increase strength, improve coordination, improve balance and increase proprioception to improve the patients ability to breathe normally and improve postural awareness    Billed With/As:   [x] TE   [x] TA   [x] Neuro   [] Self Care Patient Education: [x] Review HEP    [] Progressed/Changed HEP based on:   [x] positioning   [x] body mechanics   [] transfers   [] heat/ice application    [x] other: AROM abd>flex     Other Objective/Functional Measures:    + LTR elicited to muscles to treated with dry needling technique. No adverse reactions from [place]    See FS  Shoulder flex AROM remains limited to approx 110 degrees, abd WNL but quick to fatigue     Post Treatment Pain Level (on 0 to 10) scale:   4  / 10     ASSESSMENT  Assessment/Changes in Function:     Improving scapular myofascial mobility and inc tilt of medial scap border when in hammerlock position. Fair release of upper SCM/tendons.  Cont to have TP in UT (midline), fair release and quickly swells although able to release distal end well.      []  See Progress Note/Recertification   Patient will continue to benefit from skilled PT services to modify and progress therapeutic interventions, address functional mobility deficits, address ROM deficits, address strength deficits, analyze and address soft tissue restrictions, analyze and cue movement patterns, analyze and modify body mechanics/ergonomics, assess and modify postural abnormalities, address imbalance/dizziness and instruct in home and community integration to attain remaining goals.    Progress toward goals / Updated goals:    Progressing towards STG2, flex remains limited     PLAN  [x]  Upgrade activities as tolerated YES Continue plan of care   []  Discharge due to :    []  Other:      Therapist: Jimmy Thompson, PT, DPT, MTC, CMTPT    Date: 8/5/2021 Time: 8:19 AM     Future Appointments   Date Time Provider Dolly Solorio   8/10/2021  7:30 AM Stephanie Duty, PT [facility]   8/17/2021  7:30 AM Stephanie Duty, PT [facility]   8/24/2021  7:30 AM Stephanie Duty, PT [facility]   8/31/2021  7:30 AM Stephanie Duty, PT [facility]

## 2021-08-10 ENCOUNTER — HOSPITAL ENCOUNTER (OUTPATIENT)
Dept: PHYSICAL THERAPY | Age: 36
Discharge: HOME | End: 2021-08-10
Payer: COMMERCIAL

## 2021-08-10 PROCEDURE — 20560 NDL INSJ W/O NJX 1 OR 2 MUSC: CPT

## 2021-08-10 PROCEDURE — 97530 THERAPEUTIC ACTIVITIES: CPT

## 2021-08-10 PROCEDURE — 97110 THERAPEUTIC EXERCISES: CPT

## 2021-08-10 NOTE — PROGRESS NOTES
PHYSICAL THERAPY - DAILY TREATMENT NOTE    Patient Name: Jose Quinones        Date: 8/10/2021  : 1985   YES Patient  Verified  Visit #:     Insurance: Payor: Jose Guadalupe Youngblood / Plan: Dee Gomez / Product Type: Commerical /      In time: 730 Out time: 825   Total Treatment Time: 50     BCBS/Medicare Time Tracking (below)   Total Timed Codes (min):   1:1 Treatment Time:       TREATMENT AREA =  Neck pain on right side [M54.2]  Right shoulder pain [M25.511]    SUBJECTIVE  Pain Level (on 0 to 10 scale):  4  / 10   Medication Changes/New allergies or changes in medical history, any new surgeries or procedures? NO    If yes, update Summary List   Subjective Functional Status/Changes:  []  No changes reported     My UT is always tight but not as painful as before I started PT.  I only have to wear a strapless bra now the day after needling        Modalities Rationale:     decrease inflammation, decrease pain and increase tissue extensibility to improve patient's ability to perform ADLs   min [] Estim, type/location:                                      []  att     []  unatt     []  w/US     []  w/ice    []  w/heat    min []  Mechanical Traction: type/lbs                   []  pro   []  sup   []  int   []  cont    []  before manual    []  after manual    min []  Ultrasound, settings/location:      min []  Iontophoresis w/ dexamethasone, location:                                               []  take home patch       []  in clinic   10 min []  Ice     [x]  Heat    location/position: R C/S in supine    min []  Vasopneumatic Device, press/temp:     min []  Other:    [x] Skin assessment post-treatment (if applicable):    [x]  intact    [x]  redness- no adverse reaction     []redness  adverse reaction:        10 min Therapeutic Exercise:  [x]  See flow sheet   Rationale:      increase ROM and increase strength to improve the patients ability to perform unlimited ADLs     10 min Dry Needling:   Rationale:      decrease pain, increase ROM, increase tissue extensibility, and decrease trigger points to improve patient's ability to perform ADLs and dec HA  Select one untimed code below based on number of muscle groups needled:  [x]  CPT 99597:  needle insertion(s) without injection(s); 1 or 2 muscle(s)  []  CPT 05358:  needle insertion(s) without injection(s); 3 or more muscles  Dry Needling Procedure Note    Dry Needle Session Number:  9     Procedure: An intramuscular manual therapy (dry needling) and a neuro-muscular re-education treatment was done to deactivate myofascial trigger points, with a 15/30 gauge solid filament needle, under aseptic technique. Indication(s): [] Muscle spasms [] Myalgia/Myositis  [] Muscle cramps      [x] Muscle imbalances [] TMD (TMJ) [x] Myofascial pain & dysfunction     [] Other: __    Chart reviewed for the following:  IAmira, PT, have reviewed the medical history, summary list and precautions/contraindications for [facility].      TIME OUT performed immediately prior to start of procedure:  745am (enter time the timeout was completed)  Amira NARVAEZ, PT, have performed the following reviews on [facility] prior to the start of the session:      [x] Patient was identified by name and date of birth    [x] Agreement on all muscles being treated was verified   [x] Purpose of dry needling, side effects, possible complications, risks and benefits were explained to the patient   [x] Procedure site(s) verified  [x] Patient was positioned for comfort and draped for privacy  [x] Informed Consent was signed (initial visit) and verified verbally (subsequent visits)  [x] Patient was instructed on the need to report the use of blood thinners and/or immunosuppressant medications  [x] How to respond to possible adverse effects of treatment  [x] Self treatment of post needling soreness: ice, heat (moist heat, heat wraps) and stretching  [x] Opportunity was given to ask any questions, all questions were answered            Treatment:  The following muscles were treated today:    Right: SCM, UT with and without PE   Left:      Patients response to todays treatment:   [x]  LTRs  [x]  Muscle Relaxation  []  Pain Relief  []  Decreased Tinnitus  [x]  Decreased HAs [x]  Post needling soreness  [x]  Increased ROM   []  Other:        20 min Therapeutic Activity: [x]  See flow sheet   Rationale:    increase ROM, increase strength and improve coordination to improve the patients ability to reach, carry, push and lift with R UE     min Neuromuscular Re-ed: [x]  See flow sheet   Rationale:    increase ROM, increase strength, improve coordination, improve balance and increase proprioception to improve the patients ability to breathe normally and improve postural awareness    Billed With/As:   [x] TE   [x] TA   [x] Neuro   [] Self Care Patient Education: [x] Review HEP    [] Progressed/Changed HEP based on:   [x] positioning   [x] body mechanics   [] transfers   [] heat/ice application    [x] other: AROM abd>flex     Other Objective/Functional Measures:    + LTR elicited to muscles to treated with dry needling technique. No adverse reactions from [facility]    See FS  Shoulder flex AROM remains limited to approx 110 degrees, abd WNL but quick to fatigue     Post Treatment Pain Level (on 0 to 10) scale:   4  / 10     ASSESSMENT  Assessment/Changes in Function:     Improving scapular myofascial mobility and inc tilt of medial scap border when in hammerlock position. Fair release of upper SCM/tendons.  Cont to have TP in UT (midline), fair release and quickly swells although able to release distal end well.      []  See Progress Note/Recertification   Patient will continue to benefit from skilled PT services to modify and progress therapeutic interventions, address functional mobility deficits, address ROM deficits, address strength deficits, analyze and address soft tissue restrictions, analyze and cue movement patterns, analyze and modify body mechanics/ergonomics, assess and modify postural abnormalities, address imbalance/dizziness and instruct in home and community integration to attain remaining goals.    Progress toward goals / Updated goals:    Progressing towards STG2, flex remains limited     PLAN  [x]  Upgrade activities as tolerated YES Continue plan of care   []  Discharge due to :    []  Other:      Therapist: Bouchra Haley, PT, DPT, MTC, CMTPT    Date: 8/10/2021 Time: 8:19 AM     Future Appointments   Date Time Provider Dolly Solorio   8/17/2021  7:30 AM Vernon Burrell PT [facility]   8/24/2021  7:30 AM Vernon Burrell PT [facility]   8/31/2021  7:30 AM Vernon Burrell PT [facility]

## 2021-08-17 ENCOUNTER — APPOINTMENT (OUTPATIENT)
Dept: PHYSICAL THERAPY | Age: 36
End: 2021-08-17
Payer: COMMERCIAL

## 2021-08-24 ENCOUNTER — APPOINTMENT (OUTPATIENT)
Dept: PHYSICAL THERAPY | Age: 36
End: 2021-08-24
Payer: COMMERCIAL

## 2021-08-26 ENCOUNTER — HOSPITAL ENCOUNTER (OUTPATIENT)
Dept: PHYSICAL THERAPY | Age: 36
Discharge: HOME | End: 2021-08-26
Payer: COMMERCIAL

## 2021-08-26 PROCEDURE — 97530 THERAPEUTIC ACTIVITIES: CPT

## 2021-08-26 PROCEDURE — 20561 NDL INSJ W/O NJX 3+ MUSC: CPT

## 2021-08-26 PROCEDURE — 97110 THERAPEUTIC EXERCISES: CPT

## 2021-08-26 NOTE — PROGRESS NOTES
PHYSICAL THERAPY - DAILY TREATMENT NOTE    Patient Name: Yuval Sevilla        Date: 2021  : 1985   YES Patient  Verified  Visit #:   15   of   20  Insurance: Payor: Tito Murray / Plan: Shasha Philip / Product Type: Commerical /      In time: 130 Out time: 245   Total Treatment Time: 70     BCBS/Medicare Time Tracking (below)   Total Timed Codes (min):   1:1 Treatment Time:       TREATMENT AREA =  Neck pain on right side [M54.2]  Right shoulder pain [M25.511]    SUBJECTIVE  Pain Level (on 0 to 10 scale):  7  / 10   Medication Changes/New allergies or changes in medical history, any new surgeries or procedures? NO    If yes, update Summary List   Subjective Functional Status/Changes:  []  No changes reported     Emilie Bergman been working so much, 25 hours straight and havent been keeping up with my strengthening exercises. Have a HA but no migraines like I normally would. The nerve pain has been good and not much swelling in my hand.  My hand will get swollen right away when my arm hangs down or is cold      Modalities Rationale:     decrease inflammation, decrease pain and increase tissue extensibility to improve patient's ability to perform ADLs   min [] Estim, type/location:                                      []  att     []  unatt     []  w/US     []  w/ice    []  w/heat    min []  Mechanical Traction: type/lbs                   []  pro   []  sup   []  int   []  cont    []  before manual    []  after manual    min []  Ultrasound, settings/location:      min []  Iontophoresis w/ dexamethasone, location:                                               []  take home patch       []  in clinic   PD min []  Ice     [x]  Heat    location/position:     min []  Vasopneumatic Device, press/temp:     min []  Other:    [x] Skin assessment post-treatment (if applicable):    [x]  intact    [x]  redness- no adverse reaction     []redness  adverse reaction:        20 min Therapeutic Exercise:  [x] See flow sheet   Rationale:      increase ROM and increase strength to improve the patients ability to perform unlimited ADLs     15 min Dry Needling:   Rationale:      decrease pain, increase ROM, increase tissue extensibility, and decrease trigger points to improve patient's ability to perform ADLs and dec HA  Select one untimed code below based on number of muscle groups needled:  []  CPT 48711:  needle insertion(s) without injection(s); 1 or 2 muscle(s)  [x]  CPT 30998:  needle insertion(s) without injection(s); 3 or more muscles  Dry Needling Procedure Note    Dry Needle Session Number:  10    Procedure: An intramuscular manual therapy (dry needling) and a neuro-muscular re-education treatment was done to deactivate myofascial trigger points, with a 15/30 gauge solid filament needle, under aseptic technique. Indication(s): [] Muscle spasms [] Myalgia/Myositis  [] Muscle cramps      [x] Muscle imbalances [] TMD (TMJ) [x] Myofascial pain & dysfunction     [] Other: __    Chart reviewed for the following:  IAmira, PT, have reviewed the medical history, summary list and precautions/contraindications for [employer].      TIME OUT performed immediately prior to start of procedure:  140pm (enter time the timeout was completed)  Amira NARVAEZ PT, have performed the following reviews on [employer] prior to the start of the session:      [x] Patient was identified by name and date of birth    [x] Agreement on all muscles being treated was verified   [x] Purpose of dry needling, side effects, possible complications, risks and benefits were explained to the patient   [x] Procedure site(s) verified  [x] Patient was positioned for comfort and draped for privacy  [x] Informed Consent was signed (initial visit) and verified verbally (subsequent visits)  [x] Patient was instructed on the need to report the use of blood thinners and/or immunosuppressant medications  [x] How to respond to possible adverse effects of treatment  [x] Self treatment of post needling soreness: ice, heat (moist heat, heat wraps) and stretching  [x] Opportunity was given to ask any questions, all questions were answered            Treatment:  The following muscles were treated today:    Right: SCM, UT/MT, rhomboids, teres minor, R suboccip and upper cervical semispinalis   Left:      Patients response to todays treatment:   [x]  LTRs  [x]  Muscle Relaxation  []  Pain Relief  []  Decreased Tinnitus  [x]  Decreased HAs [x]  Post needling soreness  [x]  Increased ROM   []  Other:        35 min Therapeutic Activity: [x]  See flow sheet   Rationale:    increase ROM, increase strength and improve coordination to improve the patients ability to reach, carry, push and lift with R UE    Billed With/As:   [x] TE   [x] TA   [] Neuro   [] Self Care Patient Education: [x] Review HEP    [] Progressed/Changed HEP based on:   [x] positioning   [x] body mechanics   [] transfers   [] heat/ice application    [x] other: resume interscapular strengthening exercises. Recommended wearing postural brace prior to onset of fatigue. Reviewed TOS, diagnosis, SX, [place]     Other Objective/Functional Measures:    + LTR elicited to muscles to treated with dry needling technique. No adverse reactions from [address]  AROM scaption full, unable to perform into pure flex and abd through full ROM. Pt reports \"stopping\" point due to fatigue. PROM full   Post Treatment Pain Level (on 0 to 10) scale:   6-7  / 10     ASSESSMENT  Assessment/Changes in Function:     Good release of all TP, fair release of suboccipitals due to \"pudgy\" mm guarding feeling during palpation.       []  See Progress Note/Recertification   Patient will continue to benefit from skilled PT services to modify and progress therapeutic interventions, address functional mobility deficits, address ROM deficits, address strength deficits, analyze and address soft tissue restrictions, analyze and cue movement patterns, analyze and modify body mechanics/ergonomics, assess and modify postural abnormalities, address imbalance/dizziness and instruct in home and community integration to attain remaining goals.    Progress toward goals / Updated goals:    Progressing towards STG, flex/abd approx 120°       PLAN  [x]  Upgrade activities as tolerated YES Continue plan of care   []  Discharge due to :    []  Other:      Therapist: Jim Shafer PT, DPT, MTC, CMTPT    Date: 8/26/2021 Time: 8:19 AM     Future Appointments   Date Time Provider Dolly Solorio   8/31/2021  7:30 AM Evonnie Aase, PT [facility] [id number]

## 2021-08-31 ENCOUNTER — APPOINTMENT (OUTPATIENT)
Dept: PHYSICAL THERAPY | Age: 36
End: 2021-08-31
Payer: COMMERCIAL

## 2021-09-09 ENCOUNTER — HOSPITAL ENCOUNTER (OUTPATIENT)
Dept: PHYSICAL THERAPY | Age: 36
Discharge: HOME | End: 2021-09-09
Payer: COMMERCIAL

## 2021-09-09 PROCEDURE — 97530 THERAPEUTIC ACTIVITIES: CPT

## 2021-09-09 PROCEDURE — 97110 THERAPEUTIC EXERCISES: CPT

## 2021-09-09 PROCEDURE — 20560 NDL INSJ W/O NJX 1 OR 2 MUSC: CPT

## 2021-09-09 NOTE — PROGRESS NOTES
PHYSICAL THERAPY - DAILY TREATMENT NOTE    Patient Name: Judy Saba        Date: 2021  : 1985   YES Patient  Verified  Visit #:     Insurance: Payor: Gracie Flaherty / Plan: Veena Pelaez / Product Type: Commerical /      In time: 820 Out time: 905   Total Treatment Time: 45     BCBS/Medicare Time Tracking (below)   Total Timed Codes (min):   1:1 Treatment Time:       TREATMENT AREA =  Neck pain on right side [M54.2]  Right shoulder pain [M25.511]    SUBJECTIVE  Pain Level (on 0 to 10 scale):  4  / 10   Medication Changes/New allergies or changes in medical history, any new surgeries or procedures?     NO    If yes, update Summary List   Subjective Functional Status/Changes:  []  No changes reported     Flare up from having to move a washing machine but no migraines recently      Modalities Rationale:     decrease inflammation, decrease pain and increase tissue extensibility to improve patient's ability to perform ADLs   min [] Estim, type/location:                                      []  att     []  unatt     []  w/US     []  w/ice    []  w/heat    min []  Mechanical Traction: type/lbs                   []  pro   []  sup   []  int   []  cont    []  before manual    []  after manual    min []  Ultrasound, settings/location:      min []  Iontophoresis w/ dexamethasone, location:                                               []  take home patch       []  in clinic   PD min []  Ice     [x]  Heat    location/position:     min []  Vasopneumatic Device, press/temp:     min []  Other:    [x] Skin assessment post-treatment (if applicable):    [x]  intact    [x]  redness- no adverse reaction     []redness  adverse reaction:        15 min Therapeutic Exercise:  [x]  See flow sheet   Rationale:      increase ROM and increase strength to improve the patients ability to perform unlimited ADLs     15 min Dry Needling:   Rationale:      decrease pain, increase ROM, increase tissue extensibility, and decrease trigger points to improve patient's ability to perform ADLs and dec HA  Select one untimed code below based on number of muscle groups needled:  []  CPT 55985:  needle insertion(s) without injection(s); 1 or 2 muscle(s)  [x]  CPT 19800:  needle insertion(s) without injection(s); 3 or more muscles  Dry Needling Procedure Note    Dry Needle Session Number:  11    Procedure: An intramuscular manual therapy (dry needling) and a neuro-muscular re-education treatment was done to deactivate myofascial trigger points, with a 15/30 gauge solid filament needle, under aseptic technique. Indication(s): [] Muscle spasms [] Myalgia/Myositis  [] Muscle cramps      [x] Muscle imbalances [] TMD (TMJ) [x] Myofascial pain & dysfunction     [] Other: __    Chart reviewed for the following:  Amira NARVAEZ, PT, have reviewed the medical history, summary list and precautions/contraindications for [facility].      TIME OUT performed immediately prior to start of procedure:  835am (enter time the timeout was completed)  Amira NARVAEZ, PT, have performed the following reviews on [facility] prior to the start of the session:      [x] Patient was identified by name and date of birth    [x] Agreement on all muscles being treated was verified   [x] Purpose of dry needling, side effects, possible complications, risks and benefits were explained to the patient   [x] Procedure site(s) verified  [x] Patient was positioned for comfort and draped for privacy  [x] Informed Consent was signed (initial visit) and verified verbally (subsequent visits)  [x] Patient was instructed on the need to report the use of blood thinners and/or immunosuppressant medications  [x] How to respond to possible adverse effects of treatment  [x] Self treatment of post needling soreness: ice, heat (moist heat, heat wraps) and stretching  [x] Opportunity was given to ask any questions, all questions were answered Treatment:  The following muscles were treated today:    Right: Masseter, SCM, scalenes, UT   Left: masseter     Patients response to todays treatment:   [x]  LTRs  [x]  Muscle Relaxation  []  Pain Relief  []  Decreased Tinnitus  [x]  Decreased HAs [x]  Post needling soreness  [x]  Increased ROM   []  Other:        15 min Therapeutic Activity: [x]  See flow sheet   Rationale:    increase ROM, increase strength and improve coordination to improve the patients ability to reach, carry, push and lift with R UE    Billed With/As:   [x] TE   [x] TA   [] Neuro   [] Self Care Patient Education: [x] Review HEP    [] Progressed/Changed HEP based on:   [x] positioning   [x] body mechanics   [] transfers   [] heat/ice application    [x] other:      Other Objective/Functional Measures:    + LTR elicited to muscles to treated with dry needling technique. No adverse reactions from [address]  R lateral mandibular devation during depression   Post Treatment Pain Level (on 0 to 10) scale:   4  / 10     ASSESSMENT  Assessment/Changes in Function:     Poor release of SCM but good UT release. L masseter has fibrotic TP      []  See Progress Note/Recertification   Patient will continue to benefit from skilled PT services to modify and progress therapeutic interventions, address functional mobility deficits, address ROM deficits, address strength deficits, analyze and address soft tissue restrictions, analyze and cue movement patterns, analyze and modify body mechanics/ergonomics, assess and modify postural abnormalities, address imbalance/dizziness and instruct in home and community integration to attain remaining goals.    Progress toward goals / Updated goals:    Progressing towards STG2, scaption near full, flex approx 130 degrees       PLAN  [x]  Upgrade activities as tolerated YES Continue plan of care   []  Discharge due to :    []  Other:      Therapist: Paty Del Angel, PT, DPT, MTC, CMTPT    Date: 9/9/2021 Time: 8:19 AM Future Appointments   Date Time Provider Dolly Solorio   9/14/2021  8:15 AM Dary Stone, PT [facility] [place] [address]   9/22/2021 11:15 AM Dary Stone, PT [facility] [address]   9/29/2021 11:15 AM Dary Stone, PT [facility] [address]

## 2021-09-14 ENCOUNTER — HOSPITAL ENCOUNTER (OUTPATIENT)
Dept: PHYSICAL THERAPY | Age: 36
Discharge: HOME | End: 2021-09-14
Payer: COMMERCIAL

## 2021-09-14 PROCEDURE — 97110 THERAPEUTIC EXERCISES: CPT

## 2021-09-14 PROCEDURE — 20560 NDL INSJ W/O NJX 1 OR 2 MUSC: CPT

## 2021-09-14 PROCEDURE — 97530 THERAPEUTIC ACTIVITIES: CPT

## 2021-09-14 NOTE — PROGRESS NOTES
PHYSICAL THERAPY - DAILY TREATMENT NOTE    Patient Name: Pavel García        Date: 2021  : 1985   YES Patient  Verified  Visit #:     Insurance: Payor: Jovanni Davies / Plan: Jaylin Walker / Product Type: Commerical /      In time: 815 Out time: 910   Total Treatment Time: 50     BCBS/Medicare Time Tracking (below)   Total Timed Codes (min):   1:1 Treatment Time:       TREATMENT AREA =  Neck pain on right side [M54.2]  Right shoulder pain [M25.511]    SUBJECTIVE  Pain Level (on 0 to 10 scale):  3  / 10   Medication Changes/New allergies or changes in medical history, any new surgeries or procedures? NO    If yes, update Summary List   Subjective Functional Status/Changes:  []  No changes reported     The L sided jaw area is still a little swollen from last week. I realized I always chew gum and eat on the R side.  Posture is much better while driving and nerve glides keep getting less intense      Modalities Rationale:     decrease inflammation, decrease pain and increase tissue extensibility to improve patient's ability to perform ADLs   min [] Estim, type/location:                                      []  att     []  unatt     []  w/US     []  w/ice    []  w/heat    min []  Mechanical Traction: type/lbs                   []  pro   []  sup   []  int   []  cont    []  before manual    []  after manual    min []  Ultrasound, settings/location:      min []  Iontophoresis w/ dexamethasone, location:                                               []  take home patch       []  in clinic   PD min [x]  Ice     [x]  Heat    location/position:     min []  Vasopneumatic Device, press/temp:     min []  Other:    [x] Skin assessment post-treatment (if applicable):    [x]  intact    [x]  redness- no adverse reaction     []redness  adverse reaction:        15 min Therapeutic Exercise:  [x]  See flow sheet   Rationale:      increase ROM and increase strength to improve the patients ability to perform unlimited ADLs     15 min Dry Needling:   Rationale:      decrease pain, increase ROM, increase tissue extensibility, and decrease trigger points to improve patient's ability to perform ADLs and dec HA  Select one untimed code below based on number of muscle groups needled:  []  CPT 70133:  needle insertion(s) without injection(s); 1 or 2 muscle(s)  [x]  CPT 49679:  needle insertion(s) without injection(s); 3 or more muscles  Dry Needling Procedure Note    Dry Needle Session Number:  12    Procedure: An intramuscular manual therapy (dry needling) and a neuro-muscular re-education treatment was done to deactivate myofascial trigger points, with a 15/30 gauge solid filament needle, under aseptic technique. Indication(s): [] Muscle spasms [] Myalgia/Myositis  [] Muscle cramps      [x] Muscle imbalances [] TMD (TMJ) [x] Myofascial pain & dysfunction     [] Other: __    Chart reviewed for the following:  IAmira PT, have reviewed the medical history, summary list and precautions/contraindications for [facility].      TIME OUT performed immediately prior to start of procedure:  835am (enter time the timeout was completed)  Amira NARVAEZ PT, have performed the following reviews on [facility] prior to the start of the session:      [x] Patient was identified by name and date of birth    [x] Agreement on all muscles being treated was verified   [x] Purpose of dry needling, side effects, possible complications, risks and benefits were explained to the patient   [x] Procedure site(s) verified  [x] Patient was positioned for comfort and draped for privacy  [x] Informed Consent was signed (initial visit) and verified verbally (subsequent visits)  [x] Patient was instructed on the need to report the use of blood thinners and/or immunosuppressant medications  [x] How to respond to possible adverse effects of treatment  [x] Self treatment of post needling soreness: ice, heat (moist heat, heat wraps) and stretching  [x] Opportunity was given to ask any questions, all questions were answered            Treatment:  The following muscles were treated today:    Right: Masseter, SCM, scalenes   Left: Masseter, pyterygoids     Patients response to todays treatment:   [x]  LTRs  [x]  Muscle Relaxation  []  Pain Relief  []  Decreased Tinnitus  [x]  Decreased HAs [x]  Post needling soreness  [x]  Increased ROM   []  Other:        20 min Therapeutic Activity: [x]  See flow sheet   Rationale:    increase ROM, increase strength and improve coordination to improve the patients ability to reach, carry, push and lift with R UE    Billed With/As:   [x] TE   [x] TA   [] Neuro   [] Self Care Patient Education: [x] Review HEP    [] Progressed/Changed HEP based on:   [x] positioning   [x] body mechanics   [] transfers   [] heat/ice application    [x] other: refrain from excessive gum chewing, chew = bilaterally, prone TYI     Other Objective/Functional Measures:    + LTR elicited to muscles to treated with dry needling technique. No adverse reactions from [address] [place] [address]  R lateral mandibular deviation during depression, improved L masseter recruitment and alignment during deviation following DN to L pterygoids   Post Treatment Pain Level (on 0 to 10) scale:   3  / 10     ASSESSMENT  Assessment/Changes in Function:     Improved mm tone of SCM and release of prox fibers. Inc mm tone in superior fibers of scalenes (radicular SX reproduction along R radial hand when DN to superior mm, mid hand when DN to mid mm).      []  See Progress Note/Recertification   Patient will continue to benefit from skilled PT services to modify and progress therapeutic interventions, address functional mobility deficits, address ROM deficits, address strength deficits, analyze and address soft tissue restrictions, analyze and cue movement patterns, analyze and modify body mechanics/ergonomics, assess and modify postural abnormalities, address imbalance/dizziness and instruct in home and community integration to attain remaining goals.    Progress toward goals / Updated goals:    Progressing towards LTG3, migraine free for 2 weeks, dec in HA frequency/intensity       PLAN  [x]  Upgrade activities as tolerated YES Continue plan of care   []  Discharge due to :    []  Other:      Therapist: Bouchra Haley, PT, DPT, MTC, CMTPT    Date: 9/14/2021 Time: 8:19 AM     Future Appointments   Date Time Provider Dolly Solorio   9/22/2021 11:15 AM Vernon Burrell PT [facility]   9/29/2021 11:15 AM [facility]

## 2021-09-22 ENCOUNTER — HOSPITAL ENCOUNTER (OUTPATIENT)
Dept: PHYSICAL THERAPY | Age: 36
Discharge: HOME | End: 2021-09-22
Payer: COMMERCIAL

## 2021-09-22 PROCEDURE — 20560 NDL INSJ W/O NJX 1 OR 2 MUSC: CPT

## 2021-09-22 PROCEDURE — 97530 THERAPEUTIC ACTIVITIES: CPT

## 2021-09-22 PROCEDURE — 97110 THERAPEUTIC EXERCISES: CPT

## 2021-09-22 NOTE — PROGRESS NOTES
PHYSICAL THERAPY - DAILY TREATMENT NOTE    Patient Name: Lotus Sicard        Date: 2021  : 1985   YES Patient  Verified  Visit #:     Insurance: Payor: Quinton Sandifer / Plan: Mehdi Mendoza / Product Type: Commerical /      In time: 815 Out time: 910   Total Treatment Time: 50     BCBS/Medicare Time Tracking (below)   Total Timed Codes (min):   1:1 Treatment Time:       TREATMENT AREA =  Neck pain on right side [M54.2]  Right shoulder pain [M25.511]    SUBJECTIVE  Pain Level (on 0 to 10 scale):  3  / 10   Medication Changes/New allergies or changes in medical history, any new surgeries or procedures? NO    If yes, update Summary List   Subjective Functional Status/Changes:  []  No changes reported     The L sided jaw area is still a little swollen from last week. I realized I always chew gum and eat on the R side.  Posture is much better while driving and nerve glides keep getting less intense      Modalities Rationale:     decrease inflammation, decrease pain and increase tissue extensibility to improve patient's ability to perform ADLs   min [] Estim, type/location:                                      []  att     []  unatt     []  w/US     []  w/ice    []  w/heat    min []  Mechanical Traction: type/lbs                   []  pro   []  sup   []  int   []  cont    []  before manual    []  after manual    min []  Ultrasound, settings/location:      min []  Iontophoresis w/ dexamethasone, location:                                               []  take home patch       []  in clinic   PD min [x]  Ice     [x]  Heat    location/position:     min []  Vasopneumatic Device, press/temp:     min []  Other:    [x] Skin assessment post-treatment (if applicable):    [x]  intact    [x]  redness- no adverse reaction     []redness  adverse reaction:        15 min Therapeutic Exercise:  [x]  See flow sheet   Rationale:      increase ROM and increase strength to improve the patients ability to perform unlimited ADLs     15 min Dry Needling:   Rationale:      decrease pain, increase ROM, increase tissue extensibility, and decrease trigger points to improve patient's ability to perform ADLs and dec HA  Select one untimed code below based on number of muscle groups needled:  []  CPT 55378:  needle insertion(s) without injection(s); 1 or 2 muscle(s)  [x]  CPT 24838:  needle insertion(s) without injection(s); 3 or more muscles  Dry Needling Procedure Note    Dry Needle Session Number:  12    Procedure: An intramuscular manual therapy (dry needling) and a neuro-muscular re-education treatment was done to deactivate myofascial trigger points, with a 15/30 gauge solid filament needle, under aseptic technique. Indication(s): [] Muscle spasms [] Myalgia/Myositis  [] Muscle cramps      [x] Muscle imbalances [] TMD (TMJ) [x] Myofascial pain & dysfunction     [] Other: __    Chart reviewed for the following:  Amira NARVAEZ, PT, have reviewed the medical history, summary list and precautions/contraindications for [place].      TIME OUT performed immediately prior to start of procedure:  835am (enter time the timeout was completed)  Amira NARVAEZ PT, have performed the following reviews on [place] prior to the start of the session:      [x] Patient was identified by name and date of birth    [x] Agreement on all muscles being treated was verified   [x] Purpose of dry needling, side effects, possible complications, risks and benefits were explained to the patient   [x] Procedure site(s) verified  [x] Patient was positioned for comfort and draped for privacy  [x] Informed Consent was signed (initial visit) and verified verbally (subsequent visits)  [x] Patient was instructed on the need to report the use of blood thinners and/or immunosuppressant medications  [x] How to respond to possible adverse effects of treatment  [x] Self treatment of post needling soreness: ice, heat (moist heat, heat wraps) and stretching  [x] Opportunity was given to ask any questions, all questions were answered            Treatment:  The following muscles were treated today:    Right: Masseter, SCM, scalenes   Left: Masseter, pyterygoids     Patients response to todays treatment:   [x]  LTRs  [x]  Muscle Relaxation  []  Pain Relief  []  Decreased Tinnitus  [x]  Decreased HAs [x]  Post needling soreness  [x]  Increased ROM   []  Other:        20 min Therapeutic Activity: [x]  See flow sheet   Rationale:    increase ROM, increase strength and improve coordination to improve the patients ability to reach, carry, push and lift with R UE    Billed With/As:   [x] TE   [x] TA   [] Neuro   [] Self Care Patient Education: [x] Review HEP    [] Progressed/Changed HEP based on:   [x] positioning   [x] body mechanics   [] transfers   [] heat/ice application    [x] other: refrain from excessive gum chewing, chew = bilaterally, prone TYI     Other Objective/Functional Measures:    + LTR elicited to muscles to treated with dry needling technique. No adverse reactions from [address] [place] [address]  R lateral mandibular deviation during depression, improved L masseter recruitment and alignment during deviation following DN to L pterygoids   Post Treatment Pain Level (on 0 to 10) scale:   3  / 10     ASSESSMENT  Assessment/Changes in Function:     Improved mm tone of SCM and release of prox fibers. Inc mm tone in superior fibers of scalenes (radicular SX reproduction along R radial hand when DN to superior mm, mid hand when DN to mid mm).      []  See Progress Note/Recertification   Patient will continue to benefit from skilled PT services to modify and progress therapeutic interventions, address functional mobility deficits, address ROM deficits, address strength deficits, analyze and address soft tissue restrictions, analyze and cue movement patterns, analyze and modify body mechanics/ergonomics, assess and modify postural abnormalities, address imbalance/dizziness and instruct in home and community integration to attain remaining goals.    Progress toward goals / Updated goals:    Progressing towards LTG3, migraine free for 2 weeks, dec in HA frequency/intensity       PLAN  [x]  Upgrade activities as tolerated YES Continue plan of care   []  Discharge due to :    []  Other:      Therapist: Danielle Anderson, PT, DPT, MTC, CMTPT    Date: 9/22/2021 Time: 8:19 AM     Future Appointments   Date Time Provider Dolly Solorio   9/29/2021 11:15 AM Felicia Lora PT [facility] [address]

## 2021-09-29 ENCOUNTER — APPOINTMENT (OUTPATIENT)
Dept: PHYSICAL THERAPY | Age: 36
End: 2021-09-29
Payer: COMMERCIAL